# Patient Record
Sex: FEMALE | Race: WHITE | NOT HISPANIC OR LATINO | Employment: PART TIME | URBAN - METROPOLITAN AREA
[De-identification: names, ages, dates, MRNs, and addresses within clinical notes are randomized per-mention and may not be internally consistent; named-entity substitution may affect disease eponyms.]

---

## 2017-02-27 ENCOUNTER — COMMUNICATION - HEALTHEAST (OUTPATIENT)
Dept: INTERNAL MEDICINE | Facility: CLINIC | Age: 52
End: 2017-02-27

## 2017-02-27 DIAGNOSIS — G40.909 EPILEPSY (H): ICD-10-CM

## 2017-02-27 DIAGNOSIS — E78.5 HYPERLIPEMIA: ICD-10-CM

## 2017-02-27 DIAGNOSIS — E03.9 HYPOTHYROID: ICD-10-CM

## 2017-02-27 DIAGNOSIS — F41.9 ANXIETY: ICD-10-CM

## 2017-03-08 ENCOUNTER — COMMUNICATION - HEALTHEAST (OUTPATIENT)
Dept: INTERNAL MEDICINE | Facility: CLINIC | Age: 52
End: 2017-03-08

## 2017-03-24 ENCOUNTER — COMMUNICATION - HEALTHEAST (OUTPATIENT)
Dept: INTERNAL MEDICINE | Facility: CLINIC | Age: 52
End: 2017-03-24

## 2017-03-24 DIAGNOSIS — E03.9 HYPOTHYROID: ICD-10-CM

## 2017-03-27 ENCOUNTER — COMMUNICATION - HEALTHEAST (OUTPATIENT)
Dept: INTERNAL MEDICINE | Facility: CLINIC | Age: 52
End: 2017-03-27

## 2017-04-05 ENCOUNTER — RECORDS - HEALTHEAST (OUTPATIENT)
Dept: ADMINISTRATIVE | Facility: OTHER | Age: 52
End: 2017-04-05

## 2017-06-09 ENCOUNTER — COMMUNICATION - HEALTHEAST (OUTPATIENT)
Dept: INTERNAL MEDICINE | Facility: CLINIC | Age: 52
End: 2017-06-09

## 2017-07-13 ENCOUNTER — COMMUNICATION - HEALTHEAST (OUTPATIENT)
Dept: INTERNAL MEDICINE | Facility: CLINIC | Age: 52
End: 2017-07-13

## 2017-07-18 ENCOUNTER — COMMUNICATION - HEALTHEAST (OUTPATIENT)
Dept: INTERNAL MEDICINE | Facility: CLINIC | Age: 52
End: 2017-07-18

## 2017-08-29 ENCOUNTER — COMMUNICATION - HEALTHEAST (OUTPATIENT)
Dept: INTERNAL MEDICINE | Facility: CLINIC | Age: 52
End: 2017-08-29

## 2017-08-29 ENCOUNTER — OFFICE VISIT - HEALTHEAST (OUTPATIENT)
Dept: INTERNAL MEDICINE | Facility: CLINIC | Age: 52
End: 2017-08-29

## 2017-08-29 DIAGNOSIS — Z00.00 ROUTINE GENERAL MEDICAL EXAMINATION AT A HEALTH CARE FACILITY: ICD-10-CM

## 2017-08-29 DIAGNOSIS — M85.80 OSTEOPENIA: ICD-10-CM

## 2017-08-29 DIAGNOSIS — Z12.4 SCREENING FOR CERVICAL CANCER: ICD-10-CM

## 2017-08-29 DIAGNOSIS — G40.909 EPILEPSY (H): ICD-10-CM

## 2017-08-29 DIAGNOSIS — E78.5 HYPERLIPEMIA: ICD-10-CM

## 2017-08-29 DIAGNOSIS — E03.9 HYPOTHYROID: ICD-10-CM

## 2017-08-29 DIAGNOSIS — E89.40 PREMATURE SURGICAL MENOPAUSE: ICD-10-CM

## 2017-08-29 DIAGNOSIS — F17.200 SMOKING: ICD-10-CM

## 2017-08-29 DIAGNOSIS — D12.6 POLYP OF COLON, ADENOMATOUS: ICD-10-CM

## 2017-08-29 DIAGNOSIS — F41.9 ANXIETY: ICD-10-CM

## 2017-08-29 DIAGNOSIS — E04.1 THYROID NODULE: ICD-10-CM

## 2017-08-29 LAB
CHOLEST SERPL-MCNC: 180 MG/DL
FASTING STATUS PATIENT QL REPORTED: YES
HDLC SERPL-MCNC: 55 MG/DL
LDLC SERPL CALC-MCNC: 96 MG/DL
TRIGL SERPL-MCNC: 146 MG/DL

## 2017-08-29 ASSESSMENT — MIFFLIN-ST. JEOR: SCORE: 1651.15

## 2017-08-31 ENCOUNTER — COMMUNICATION - HEALTHEAST (OUTPATIENT)
Dept: INTERNAL MEDICINE | Facility: CLINIC | Age: 52
End: 2017-08-31

## 2017-09-01 LAB
HPV INTERPRETATION - HISTORICAL: NORMAL
HPV INTERPRETER - HISTORICAL: NORMAL

## 2017-09-06 ENCOUNTER — COMMUNICATION - HEALTHEAST (OUTPATIENT)
Dept: INTERNAL MEDICINE | Facility: CLINIC | Age: 52
End: 2017-09-06

## 2017-09-07 ENCOUNTER — HOSPITAL ENCOUNTER (OUTPATIENT)
Dept: ULTRASOUND IMAGING | Facility: CLINIC | Age: 52
Discharge: HOME | End: 2017-09-07
Attending: INTERNAL MEDICINE

## 2017-09-07 DIAGNOSIS — E03.9 HYPOTHYROID: ICD-10-CM

## 2017-09-07 DIAGNOSIS — E04.1 THYROID NODULE: ICD-10-CM

## 2017-09-14 ENCOUNTER — RECORDS - HEALTHEAST (OUTPATIENT)
Dept: BONE DENSITY | Facility: CLINIC | Age: 52
End: 2017-09-14

## 2017-09-14 ENCOUNTER — RECORDS - HEALTHEAST (OUTPATIENT)
Dept: ADMINISTRATIVE | Facility: OTHER | Age: 52
End: 2017-09-14

## 2017-09-14 DIAGNOSIS — E89.40 ASYMPTOMATIC POSTPROCEDURAL OVARIAN FAILURE: ICD-10-CM

## 2017-09-14 DIAGNOSIS — M85.80 OTHER SPECIFIED DISORDERS OF BONE DENSITY AND STRUCTURE, UNSPECIFIED SITE: ICD-10-CM

## 2017-09-19 ENCOUNTER — COMMUNICATION - HEALTHEAST (OUTPATIENT)
Dept: INTERNAL MEDICINE | Facility: CLINIC | Age: 52
End: 2017-09-19

## 2017-09-28 ENCOUNTER — AMBULATORY - HEALTHEAST (OUTPATIENT)
Dept: NURSING | Facility: CLINIC | Age: 52
End: 2017-09-28

## 2017-11-14 ENCOUNTER — COMMUNICATION - HEALTHEAST (OUTPATIENT)
Dept: INTERNAL MEDICINE | Facility: CLINIC | Age: 52
End: 2017-11-14

## 2017-11-14 DIAGNOSIS — E78.5 HYPERLIPEMIA: ICD-10-CM

## 2017-11-14 DIAGNOSIS — F41.9 ANXIETY: ICD-10-CM

## 2017-12-12 ENCOUNTER — COMMUNICATION - HEALTHEAST (OUTPATIENT)
Dept: INTERNAL MEDICINE | Facility: CLINIC | Age: 52
End: 2017-12-12

## 2017-12-13 ENCOUNTER — COMMUNICATION - HEALTHEAST (OUTPATIENT)
Dept: INTERNAL MEDICINE | Facility: CLINIC | Age: 52
End: 2017-12-13

## 2017-12-13 DIAGNOSIS — E78.5 HYPERLIPEMIA: ICD-10-CM

## 2018-01-15 ENCOUNTER — COMMUNICATION - HEALTHEAST (OUTPATIENT)
Dept: INTERNAL MEDICINE | Facility: CLINIC | Age: 53
End: 2018-01-15

## 2018-01-26 ENCOUNTER — COMMUNICATION - HEALTHEAST (OUTPATIENT)
Dept: INTERNAL MEDICINE | Facility: CLINIC | Age: 53
End: 2018-01-26

## 2018-01-26 DIAGNOSIS — F41.9 ANXIETY: ICD-10-CM

## 2018-02-13 ENCOUNTER — COMMUNICATION - HEALTHEAST (OUTPATIENT)
Dept: INTERNAL MEDICINE | Facility: CLINIC | Age: 53
End: 2018-02-13

## 2018-03-09 ENCOUNTER — COMMUNICATION - HEALTHEAST (OUTPATIENT)
Dept: INTERNAL MEDICINE | Facility: CLINIC | Age: 53
End: 2018-03-09

## 2018-03-16 ENCOUNTER — COMMUNICATION - HEALTHEAST (OUTPATIENT)
Dept: INTERNAL MEDICINE | Facility: CLINIC | Age: 53
End: 2018-03-16

## 2018-03-17 ENCOUNTER — COMMUNICATION - HEALTHEAST (OUTPATIENT)
Dept: INTERNAL MEDICINE | Facility: CLINIC | Age: 53
End: 2018-03-17

## 2018-03-17 DIAGNOSIS — G40.909 EPILEPSY (H): ICD-10-CM

## 2018-03-23 ENCOUNTER — OFFICE VISIT - HEALTHEAST (OUTPATIENT)
Dept: INTERNAL MEDICINE | Facility: CLINIC | Age: 53
End: 2018-03-23

## 2018-03-23 DIAGNOSIS — E78.5 HYPERLIPIDEMIA: ICD-10-CM

## 2018-03-23 DIAGNOSIS — Z12.31 ENCOUNTER FOR SCREENING MAMMOGRAM FOR BREAST CANCER: ICD-10-CM

## 2018-03-23 LAB — LDLC SERPL CALC-MCNC: 213 MG/DL

## 2018-04-02 ENCOUNTER — COMMUNICATION - HEALTHEAST (OUTPATIENT)
Dept: INTERNAL MEDICINE | Facility: CLINIC | Age: 53
End: 2018-04-02

## 2018-04-09 ENCOUNTER — HOSPITAL ENCOUNTER (OUTPATIENT)
Dept: MAMMOGRAPHY | Facility: CLINIC | Age: 53
Discharge: HOME | End: 2018-04-09

## 2018-04-09 DIAGNOSIS — Z12.31 VISIT FOR SCREENING MAMMOGRAM: ICD-10-CM

## 2018-05-02 ENCOUNTER — OFFICE VISIT - HEALTHEAST (OUTPATIENT)
Dept: INTERNAL MEDICINE | Facility: CLINIC | Age: 53
End: 2018-05-02

## 2018-05-02 DIAGNOSIS — K21.9 GERD (GASTROESOPHAGEAL REFLUX DISEASE): ICD-10-CM

## 2018-05-02 DIAGNOSIS — F41.9 ANXIETY: ICD-10-CM

## 2018-05-02 DIAGNOSIS — E78.5 HYPERLIPIDEMIA: ICD-10-CM

## 2018-05-02 LAB
LDLC SERPL CALC-MCNC: 118 MG/DL
TSH SERPL DL<=0.005 MIU/L-ACNC: 2.68 UIU/ML (ref 0.3–5)

## 2018-05-10 ENCOUNTER — COMMUNICATION - HEALTHEAST (OUTPATIENT)
Dept: INTERNAL MEDICINE | Facility: CLINIC | Age: 53
End: 2018-05-10

## 2018-05-31 ENCOUNTER — RECORDS - HEALTHEAST (OUTPATIENT)
Dept: ADMINISTRATIVE | Facility: OTHER | Age: 53
End: 2018-05-31

## 2018-06-06 ENCOUNTER — COMMUNICATION - HEALTHEAST (OUTPATIENT)
Dept: INTERNAL MEDICINE | Facility: CLINIC | Age: 53
End: 2018-06-06

## 2018-06-06 DIAGNOSIS — F41.9 ANXIETY: ICD-10-CM

## 2018-06-06 DIAGNOSIS — G40.909 EPILEPSY (H): ICD-10-CM

## 2018-06-06 DIAGNOSIS — K21.9 GERD (GASTROESOPHAGEAL REFLUX DISEASE): ICD-10-CM

## 2018-06-06 DIAGNOSIS — E03.9 HYPOTHYROID: ICD-10-CM

## 2018-06-06 RX ORDER — LEVETIRACETAM 500 MG/1
TABLET ORAL
Qty: 210 TABLET | Refills: 1 | Status: SHIPPED | OUTPATIENT
Start: 2018-06-06

## 2018-06-06 RX ORDER — TOPIRAMATE 100 MG/1
100 TABLET, FILM COATED ORAL 2 TIMES DAILY
Qty: 180 TABLET | Refills: 3 | Status: SHIPPED | OUTPATIENT
Start: 2018-06-06

## 2018-06-11 ENCOUNTER — COMMUNICATION - HEALTHEAST (OUTPATIENT)
Dept: INTERNAL MEDICINE | Facility: CLINIC | Age: 53
End: 2018-06-11

## 2018-06-11 DIAGNOSIS — E78.5 HYPERLIPIDEMIA: ICD-10-CM

## 2018-06-14 ENCOUNTER — COMMUNICATION - HEALTHEAST (OUTPATIENT)
Dept: INTERNAL MEDICINE | Facility: CLINIC | Age: 53
End: 2018-06-14

## 2018-06-15 ENCOUNTER — COMMUNICATION - HEALTHEAST (OUTPATIENT)
Dept: INTERNAL MEDICINE | Facility: CLINIC | Age: 53
End: 2018-06-15

## 2018-08-03 ENCOUNTER — COMMUNICATION - HEALTHEAST (OUTPATIENT)
Dept: SCHEDULING | Facility: CLINIC | Age: 53
End: 2018-08-03

## 2018-08-09 ENCOUNTER — OFFICE VISIT - HEALTHEAST (OUTPATIENT)
Dept: INTERNAL MEDICINE | Facility: CLINIC | Age: 53
End: 2018-08-09

## 2018-08-09 DIAGNOSIS — M70.62 TROCHANTERIC BURSITIS OF LEFT HIP: ICD-10-CM

## 2018-09-07 ENCOUNTER — COMMUNICATION - HEALTHEAST (OUTPATIENT)
Dept: INTERNAL MEDICINE | Facility: CLINIC | Age: 53
End: 2018-09-07

## 2018-09-07 DIAGNOSIS — E03.9 HYPOTHYROID: ICD-10-CM

## 2018-10-07 ENCOUNTER — COMMUNICATION - HEALTHEAST (OUTPATIENT)
Dept: INTERNAL MEDICINE | Facility: CLINIC | Age: 53
End: 2018-10-07

## 2018-10-07 DIAGNOSIS — K21.9 GERD (GASTROESOPHAGEAL REFLUX DISEASE): ICD-10-CM

## 2019-03-12 ENCOUNTER — COMMUNICATION - HEALTHEAST (OUTPATIENT)
Dept: INTERNAL MEDICINE | Facility: CLINIC | Age: 54
End: 2019-03-12

## 2019-03-12 DIAGNOSIS — F41.9 ANXIETY: ICD-10-CM

## 2019-04-01 ENCOUNTER — OFFICE VISIT - HEALTHEAST (OUTPATIENT)
Dept: INTERNAL MEDICINE | Facility: CLINIC | Age: 54
End: 2019-04-01

## 2019-04-01 DIAGNOSIS — Z00.00 ANNUAL PHYSICAL EXAM: ICD-10-CM

## 2019-04-01 DIAGNOSIS — E04.1 THYROID NODULE: ICD-10-CM

## 2019-04-01 DIAGNOSIS — E78.5 HYPERLIPIDEMIA LDL GOAL <160: ICD-10-CM

## 2019-04-01 DIAGNOSIS — E03.9 HYPOTHYROIDISM (ACQUIRED): ICD-10-CM

## 2019-04-01 DIAGNOSIS — M85.89 OSTEOPENIA OF MULTIPLE SITES: ICD-10-CM

## 2019-04-01 ASSESSMENT — MIFFLIN-ST. JEOR: SCORE: 1649.95

## 2019-04-03 ENCOUNTER — AMBULATORY - HEALTHEAST (OUTPATIENT)
Dept: LAB | Facility: CLINIC | Age: 54
End: 2019-04-03

## 2019-04-03 ENCOUNTER — HOSPITAL ENCOUNTER (OUTPATIENT)
Dept: LAB | Age: 54
Setting detail: SPECIMEN
Discharge: HOME | End: 2019-04-03

## 2019-04-03 ENCOUNTER — COMMUNICATION - HEALTHEAST (OUTPATIENT)
Dept: INTERNAL MEDICINE | Facility: CLINIC | Age: 54
End: 2019-04-03

## 2019-04-03 DIAGNOSIS — E04.1 THYROID NODULE: ICD-10-CM

## 2019-04-03 DIAGNOSIS — E78.5 HYPERLIPIDEMIA LDL GOAL <160: ICD-10-CM

## 2019-04-03 DIAGNOSIS — M85.89 OSTEOPENIA OF MULTIPLE SITES: ICD-10-CM

## 2019-04-03 DIAGNOSIS — E03.9 HYPOTHYROIDISM (ACQUIRED): ICD-10-CM

## 2019-04-03 LAB
CHOLEST SERPL-MCNC: 209 MG/DL
FASTING STATUS PATIENT QL REPORTED: YES
HDLC SERPL-MCNC: 63 MG/DL
LDLC SERPL CALC-MCNC: 119 MG/DL
TRIGL SERPL-MCNC: 134 MG/DL
TSH SERPL DL<=0.005 MIU/L-ACNC: 7.8 UIU/ML (ref 0.3–5)

## 2019-04-04 LAB
25(OH)D3 SERPL-MCNC: 41.8 NG/ML (ref 30–80)
25(OH)D3 SERPL-MCNC: 41.8 NG/ML (ref 30–80)

## 2019-04-05 ENCOUNTER — AMBULATORY - HEALTHEAST (OUTPATIENT)
Dept: INTERNAL MEDICINE | Facility: CLINIC | Age: 54
End: 2019-04-05

## 2019-04-05 DIAGNOSIS — E03.9 HYPOTHYROID: ICD-10-CM

## 2019-04-05 DIAGNOSIS — E03.9 HYPOTHYROIDISM (ACQUIRED): ICD-10-CM

## 2019-04-06 ENCOUNTER — COMMUNICATION - HEALTHEAST (OUTPATIENT)
Dept: INTERNAL MEDICINE | Facility: CLINIC | Age: 54
End: 2019-04-06

## 2019-04-06 DIAGNOSIS — E03.9 HYPOTHYROID: ICD-10-CM

## 2019-04-06 DIAGNOSIS — E03.9 HYPOTHYROIDISM (ACQUIRED): ICD-10-CM

## 2019-05-11 ENCOUNTER — COMMUNICATION - HEALTHEAST (OUTPATIENT)
Dept: INTERNAL MEDICINE | Facility: CLINIC | Age: 54
End: 2019-05-11

## 2019-05-11 DIAGNOSIS — E78.5 HYPERLIPIDEMIA, UNSPECIFIED HYPERLIPIDEMIA TYPE: ICD-10-CM

## 2019-05-11 DIAGNOSIS — E03.9 HYPOTHYROID: ICD-10-CM

## 2019-05-13 ENCOUNTER — COMMUNICATION - HEALTHEAST (OUTPATIENT)
Dept: INTERNAL MEDICINE | Facility: CLINIC | Age: 54
End: 2019-05-13

## 2019-05-13 DIAGNOSIS — E78.41 ELEVATED LIPOPROTEIN(A): ICD-10-CM

## 2019-06-05 ENCOUNTER — COMMUNICATION - HEALTHEAST (OUTPATIENT)
Dept: INTERNAL MEDICINE | Facility: CLINIC | Age: 54
End: 2019-06-05

## 2019-06-05 DIAGNOSIS — E78.5 HYPERLIPIDEMIA, UNSPECIFIED HYPERLIPIDEMIA TYPE: ICD-10-CM

## 2019-08-15 ENCOUNTER — COMMUNICATION - HEALTHEAST (OUTPATIENT)
Dept: INTERNAL MEDICINE | Facility: CLINIC | Age: 54
End: 2019-08-15

## 2019-08-15 DIAGNOSIS — E03.9 HYPOTHYROID: ICD-10-CM

## 2019-08-15 DIAGNOSIS — F41.9 ANXIETY: ICD-10-CM

## 2019-08-15 DIAGNOSIS — E03.9 HYPOTHYROIDISM (ACQUIRED): ICD-10-CM

## 2019-08-15 DIAGNOSIS — E78.5 HYPERLIPIDEMIA, UNSPECIFIED HYPERLIPIDEMIA TYPE: ICD-10-CM

## 2019-09-19 ENCOUNTER — OFFICE VISIT - HEALTHEAST (OUTPATIENT)
Dept: INTERNAL MEDICINE | Facility: CLINIC | Age: 54
End: 2019-09-19

## 2019-09-19 DIAGNOSIS — F41.9 ANXIETY: ICD-10-CM

## 2019-09-19 DIAGNOSIS — E03.9 HYPOTHYROID: ICD-10-CM

## 2019-09-19 DIAGNOSIS — R56.9 SEIZURES (H): ICD-10-CM

## 2019-09-19 DIAGNOSIS — E78.5 HYPERLIPIDEMIA, UNSPECIFIED HYPERLIPIDEMIA TYPE: ICD-10-CM

## 2019-09-19 DIAGNOSIS — E03.9 HYPOTHYROIDISM (ACQUIRED): ICD-10-CM

## 2019-09-19 DIAGNOSIS — E55.9 VITAMIN D DEFICIENCY: ICD-10-CM

## 2019-09-19 RX ORDER — PRAVASTATIN SODIUM 40 MG
40 TABLET ORAL DAILY
Qty: 90 TABLET | Refills: 2 | Status: SHIPPED | OUTPATIENT
Start: 2019-09-19

## 2019-09-19 ASSESSMENT — MIFFLIN-ST. JEOR: SCORE: 1664.92

## 2019-09-21 ENCOUNTER — COMMUNICATION - HEALTHEAST (OUTPATIENT)
Dept: INTERNAL MEDICINE | Facility: CLINIC | Age: 54
End: 2019-09-21

## 2019-09-25 ENCOUNTER — AMBULATORY - HEALTHEAST (OUTPATIENT)
Dept: LAB | Facility: CLINIC | Age: 54
End: 2019-09-25

## 2019-09-25 ENCOUNTER — COMMUNICATION - HEALTHEAST (OUTPATIENT)
Dept: INTERNAL MEDICINE | Facility: CLINIC | Age: 54
End: 2019-09-25

## 2019-09-25 DIAGNOSIS — R56.9 SEIZURES (H): ICD-10-CM

## 2019-09-25 DIAGNOSIS — E03.9 HYPOTHYROIDISM (ACQUIRED): ICD-10-CM

## 2019-09-25 DIAGNOSIS — E78.5 HYPERLIPIDEMIA, UNSPECIFIED HYPERLIPIDEMIA TYPE: ICD-10-CM

## 2019-09-25 DIAGNOSIS — E55.9 VITAMIN D DEFICIENCY: ICD-10-CM

## 2019-09-25 LAB
CHOLEST SERPL-MCNC: 220 MG/DL
FASTING STATUS PATIENT QL REPORTED: YES
HDLC SERPL-MCNC: 65 MG/DL
LDLC SERPL CALC-MCNC: 125 MG/DL
LEVETIRACETAM (KEPPRA): 29.6 UG/ML (ref 6–46)
TRIGL SERPL-MCNC: 148 MG/DL
TSH SERPL DL<=0.005 MIU/L-ACNC: 3.05 UIU/ML (ref 0.3–5)

## 2019-09-26 LAB
25(OH)D3 SERPL-MCNC: 31.5 NG/ML (ref 30–80)
25(OH)D3 SERPL-MCNC: 31.5 NG/ML (ref 30–80)
TOPIRAMATE SERPL-MCNC: 5.9 UG/ML (ref 5–20)

## 2019-09-27 ENCOUNTER — COMMUNICATION - HEALTHEAST (OUTPATIENT)
Dept: INTERNAL MEDICINE | Facility: CLINIC | Age: 54
End: 2019-09-27

## 2019-10-04 ENCOUNTER — OFFICE VISIT - HEALTHEAST (OUTPATIENT)
Dept: INTERNAL MEDICINE | Facility: CLINIC | Age: 54
End: 2019-10-04

## 2019-10-04 DIAGNOSIS — L98.9 SKIN LESION: ICD-10-CM

## 2019-10-04 ASSESSMENT — MIFFLIN-ST. JEOR: SCORE: 1651.31

## 2019-10-08 LAB
LAB AP CHARGES (HE HISTORICAL CONVERSION): NORMAL
PATH REPORT.COMMENTS IMP SPEC: NORMAL
PATH REPORT.FINAL DX SPEC: NORMAL
PATH REPORT.GROSS SPEC: NORMAL
PATH REPORT.MICROSCOPIC SPEC OTHER STN: NORMAL
PATH REPORT.RELEVANT HX SPEC: NORMAL
RESULT FLAG (HE HISTORICAL CONVERSION): NORMAL

## 2020-03-26 ENCOUNTER — COMMUNICATION - HEALTHEAST (OUTPATIENT)
Dept: INTERNAL MEDICINE | Facility: CLINIC | Age: 55
End: 2020-03-26

## 2020-03-26 DIAGNOSIS — E03.9 HYPOTHYROID: ICD-10-CM

## 2020-03-26 DIAGNOSIS — E03.9 HYPOTHYROIDISM (ACQUIRED): ICD-10-CM

## 2020-04-24 ENCOUNTER — COMMUNICATION - HEALTHEAST (OUTPATIENT)
Dept: INTERNAL MEDICINE | Facility: CLINIC | Age: 55
End: 2020-04-24

## 2020-04-24 DIAGNOSIS — F41.9 ANXIETY: ICD-10-CM

## 2020-04-24 DIAGNOSIS — E03.9 HYPOTHYROIDISM (ACQUIRED): ICD-10-CM

## 2020-04-24 DIAGNOSIS — E03.9 HYPOTHYROID: ICD-10-CM

## 2020-04-24 RX ORDER — VENLAFAXINE HYDROCHLORIDE 150 MG/1
150 CAPSULE, EXTENDED RELEASE ORAL DAILY
Qty: 90 CAPSULE | Refills: 0 | Status: SHIPPED | OUTPATIENT
Start: 2020-04-24

## 2020-04-24 RX ORDER — LEVOTHYROXINE SODIUM 88 UG/1
88 TABLET ORAL DAILY
Qty: 90 TABLET | Refills: 0 | Status: SHIPPED | OUTPATIENT
Start: 2020-04-24

## 2020-07-21 ENCOUNTER — COMMUNICATION - HEALTHEAST (OUTPATIENT)
Dept: INTERNAL MEDICINE | Facility: CLINIC | Age: 55
End: 2020-07-21

## 2020-07-21 DIAGNOSIS — E03.9 HYPOTHYROID: ICD-10-CM

## 2020-07-21 DIAGNOSIS — F41.9 ANXIETY: ICD-10-CM

## 2020-07-21 DIAGNOSIS — E78.5 HYPERLIPIDEMIA, UNSPECIFIED HYPERLIPIDEMIA TYPE: ICD-10-CM

## 2020-07-21 DIAGNOSIS — E03.9 HYPOTHYROIDISM (ACQUIRED): ICD-10-CM

## 2021-05-28 NOTE — TELEPHONE ENCOUNTER
Refill Approved    Rx renewed per Medication Renewal Policy. Medication was last renewed on 4/8/19, last OV 4/1/19.    Sweetie Daily, [facility] Triage/Med Refill 5/11/2019     Requested Prescriptions   Pending Prescriptions Disp Refills     levothyroxine (SYNTHROID, LEVOTHROID) 75 MCG tablet [Pharmacy Med Name: LEVOTHYROXINE 0.075MG (75MCG) TABS] 60 tablet 0     Sig: TAKE 1 TABLET(75 MCG) BY MOUTH DAILY       Thyroid Hormones Protocol Passed - 5/11/2019 10:56 AM        Passed - Provider visit in past 12 months or next 3 months     Last office visit with prescriber/PCP: 8/9/2018 Ashlie Lewis MD OR same dept: 4/1/2019 Aleksandra Wilson MD OR same specialty: 4/1/2019 Aleksandra Wilson MD  Last physical: Visit date not found Last MTM visit: Visit date not found   Next visit within 3 mo: Visit date not found  Next physical within 3 mo: Visit date not found  Prescriber OR PCP: Ashlie Lewis MD  Last diagnosis associated with med order: 1. Hypothyroid  - levothyroxine (SYNTHROID, LEVOTHROID) 75 MCG tablet [Pharmacy Med Name: LEVOTHYROXINE 0.075MG (75MCG) TABS]; TAKE 1 TABLET(75 MCG) BY MOUTH DAILY  Dispense: 60 tablet; Refill: 0    If protocol passes may refill for 12 months if within 3 months of last provider visit (or a total of 15 months).             Passed - TSH on file in past 12 months for patient age 12 & older     TSH   Date Value Ref Range Status   04/03/2019 7.80 (H) 0.30 - 5.00 uIU/mL Final

## 2021-05-28 NOTE — TELEPHONE ENCOUNTER
Refill Approved    Rx renewed per Medication Renewal Policy. Medication was last renewed on 3/12/2019 with 3 refills. Change in pharmacy noted.   Last office visit: 4/1/2019 with PCP Dr RAEANN Grey, [facility] Triage/Med Refill 5/12/2019     Requested Prescriptions   Pending Prescriptions Disp Refills     pravastatin (PRAVACHOL) 20 MG tablet [Pharmacy Med Name: PRAVASTATIN 20MG TABLETS] 60 tablet 0     Sig: TAKE 1 TABLET(20 MG) BY MOUTH AT BEDTIME       Statins Refill Protocol (Hmg CoA Reductase Inhibitors) Passed - 5/11/2019 10:58 AM        Passed - PCP or prescribing provider visit in past 12 months      Last office visit with prescriber/PCP: 3/23/2018 Darian Farfan CNP OR same dept: 4/1/2019 Aleksandra Wilson MD OR same specialty: 4/1/2019 Aleksandra Wilson MD  Last physical: Visit date not found Last MTM visit: Visit date not found   Next visit within 3 mo: Visit date not found  Next physical within 3 mo: Visit date not found  Prescriber OR PCP: Darian Farfan CNP  Last diagnosis associated with med order: There are no diagnoses linked to this encounter.  If protocol passes may refill for 12 months if within 3 months of last provider visit (or a total of 15 months).

## 2021-05-31 VITALS — BODY MASS INDEX: 32.97 KG/M2 | WEIGHT: 222.6 LBS | HEIGHT: 69 IN

## 2021-05-31 NOTE — TELEPHONE ENCOUNTER
Refill Request  Did you contact pharmacy: No  Medication name:   Requested Prescriptions     Pending Prescriptions Disp Refills     levothyroxine (SYNTHROID, LEVOTHROID) 75 MCG tablet  0     Sig: Take 1 tablet (75 mcg total) by mouth daily.     pravastatin (PRAVACHOL) 40 MG tablet 90 tablet 3     venlafaxine (EFFEXOR-XR) 150 MG 24 hr capsule 90 capsule 1     Sig: Take 1 capsule (150 mg total) by mouth daily.     Who prescribed the medication: Dr Wilson, Dr Ashlie Lewis, Darian Farfan NP      Pharmacy Name and Location: Changing pharmacy to [facility].  Fax request received from pharmacy.    Okay to leave a detailed message: yes

## 2021-05-31 NOTE — TELEPHONE ENCOUNTER
Former patient of aleksandra Wilson & has not established care with another provider.  Please assign refill request to covering provider per Clinic standard process.      Rx renewed per Medication Renewal Policy. Medication was last renewed on 4/8/19.6/8/19    Mahnaz Cárdenas, [facility] Connection Triage/Med Refill 8/15/2019     Requested Prescriptions   Pending Prescriptions Disp Refills     levothyroxine (SYNTHROID, LEVOTHROID) 75 MCG tablet  0     Sig: Take 1 tablet (75 mcg total) by mouth daily.       Thyroid Hormones Protocol Passed - 8/15/2019  1:41 PM        Passed - Provider visit in past 12 months or next 3 months     Last office visit with prescriber/PCP: 4/1/2019 Aleksandra Wilson MD OR same dept: 4/1/2019 Aleksandra Wilson MD OR same specialty: 4/1/2019 Aleksandra Wilson MD  Last physical: Visit date not found Last MTM visit: Visit date not found   Next visit within 3 mo: Visit date not found  Next physical within 3 mo: Visit date not found  Prescriber OR PCP: Aleksandra Wilson MD  Last diagnosis associated with med order: 1. Hypothyroid  - levothyroxine (SYNTHROID, LEVOTHROID) 75 MCG tablet; Take 1 tablet (75 mcg total) by mouth daily.; Refill: 0    2. Hypothyroidism (acquired)  - levothyroxine (SYNTHROID, LEVOTHROID) 75 MCG tablet; Take 1 tablet (75 mcg total) by mouth daily.; Refill: 0    3. Hyperlipidemia, unspecified hyperlipidemia type  - pravastatin (PRAVACHOL) 40 MG tablet  Dispense: 90 tablet; Refill: 3    4. Anxiety  - venlafaxine (EFFEXOR-XR) 150 MG 24 hr capsule; Take 1 capsule (150 mg total) by mouth daily.  Dispense: 90 capsule; Refill: 1    If protocol passes may refill for 12 months if within 3 months of last provider visit (or a total of 15 months).             Passed - TSH on file in past 12 months for patient age 12 & older     TSH   Date Value Ref Range Status   04/03/2019 7.80 (H) 0.30 - 5.00 uIU/mL Final                   pravastatin (PRAVACHOL) 40 MG tablet 90 tablet 3       Statins Refill Protocol (Hmg CoA  Reductase Inhibitors) Passed - 8/15/2019  1:41 PM        Passed - PCP or prescribing provider visit in past 12 months      Last office visit with prescriber/PCP: 4/1/2019 Aleksandra Wilson MD OR same dept: 4/1/2019 Aleksandra Wilson MD OR same specialty: 4/1/2019 Aleksandra Wilson MD  Last physical: Visit date not found Last MTM visit: Visit date not found   Next visit within 3 mo: Visit date not found  Next physical within 3 mo: Visit date not found  Prescriber OR PCP: Aleksandra Wilson MD  Last diagnosis associated with med order: 1. Hypothyroid  - levothyroxine (SYNTHROID, LEVOTHROID) 75 MCG tablet; Take 1 tablet (75 mcg total) by mouth daily.; Refill: 0    2. Hypothyroidism (acquired)  - levothyroxine (SYNTHROID, LEVOTHROID) 75 MCG tablet; Take 1 tablet (75 mcg total) by mouth daily.; Refill: 0    3. Hyperlipidemia, unspecified hyperlipidemia type  - pravastatin (PRAVACHOL) 40 MG tablet  Dispense: 90 tablet; Refill: 3    4. Anxiety  - venlafaxine (EFFEXOR-XR) 150 MG 24 hr capsule; Take 1 capsule (150 mg total) by mouth daily.  Dispense: 90 capsule; Refill: 1    If protocol passes may refill for 12 months if within 3 months of last provider visit (or a total of 15 months).             venlafaxine (EFFEXOR-XR) 150 MG 24 hr capsule 90 capsule 1     Sig: Take 1 capsule (150 mg total) by mouth daily.       Venlafaxine/Desvenlafaxine Refill Protocol Failed - 8/15/2019  1:41 PM        Failed - LFT or AST or ALT in last year     Albumin   Date Value Ref Range Status   08/29/2017 4.0 3.5 - 5.0 g/dL Final     Bilirubin, Total   Date Value Ref Range Status   08/29/2017 0.4 0.0 - 1.0 mg/dL Final     Alkaline Phosphatase   Date Value Ref Range Status   08/29/2017 64 45 - 120 U/L Final     AST   Date Value Ref Range Status   08/29/2017 15 0 - 40 U/L Final     ALT   Date Value Ref Range Status   08/29/2017 16 0 - 45 U/L Final     Protein, Total   Date Value Ref Range Status   08/29/2017 7.0 6.0 - 8.0 g/dL Final                Passed - Fasting  lipid cascade in last year     Cholesterol   Date Value Ref Range Status   04/03/2019 209 (H) <=199 mg/dL Final     Triglycerides   Date Value Ref Range Status   04/03/2019 134 <=149 mg/dL Final     HDL Cholesterol   Date Value Ref Range Status   04/03/2019 63 >=50 mg/dL Final     LDL Calculated   Date Value Ref Range Status   04/03/2019 119 <=129 mg/dL Final     Patient Fasting > 8hrs?   Date Value Ref Range Status   04/03/2019 Yes  Final             Passed - PCP or prescribing provider visit in last year     Last office visit with prescriber/PCP: 4/1/2019 Aleksandra Wilson MD OR same dept: 4/1/2019 Aleksandra Wilson MD OR same specialty: 4/1/2019 Aleksandra Wilson MD  Last physical: Visit date not found Last MTM visit: Visit date not found   Next visit within 3 mo: Visit date not found  Next physical within 3 mo: Visit date not found  Prescriber OR PCP: Aleksandra Wilson MD  Last diagnosis associated with med order: 1. Hypothyroid  - levothyroxine (SYNTHROID, LEVOTHROID) 75 MCG tablet; Take 1 tablet (75 mcg total) by mouth daily.; Refill: 0    2. Hypothyroidism (acquired)  - levothyroxine (SYNTHROID, LEVOTHROID) 75 MCG tablet; Take 1 tablet (75 mcg total) by mouth daily.; Refill: 0    3. Hyperlipidemia, unspecified hyperlipidemia type  - pravastatin (PRAVACHOL) 40 MG tablet  Dispense: 90 tablet; Refill: 3    4. Anxiety  - venlafaxine (EFFEXOR-XR) 150 MG 24 hr capsule; Take 1 capsule (150 mg total) by mouth daily.  Dispense: 90 capsule; Refill: 1    If protocol passes may refill for 12 months if within 3 months of last provider visit (or a total of 15 months).             Passed - Blood Pressure in last year     BP Readings from Last 1 Encounters:   04/01/19 122/78

## 2021-06-01 VITALS — WEIGHT: 229.56 LBS | BODY MASS INDEX: 34.4 KG/M2

## 2021-06-01 VITALS — WEIGHT: 233.3 LBS | BODY MASS INDEX: 34.96 KG/M2

## 2021-06-01 VITALS — BODY MASS INDEX: 33.34 KG/M2 | WEIGHT: 222.5 LBS

## 2021-06-01 NOTE — PROGRESS NOTES
Internal Medicine Office Visit  [facility]  Patient Name: Carmen Tucker  Patient Age: 54 y.o.  YOB: 1965  MRN: [id number]    Date of Visit: 2019  Reason for Office Visit:   Chief Complaint   Patient presents with     Medication Refill     Needing to have a couple medications refilled.            Assessment / Plan / Medical Decision Makin. Hyperlipidemia, unspecified hyperlipidemia type  - pravastatin (PRAVACHOL) 40 MG tablet; Take 1 tablet (40 mg total) by mouth daily.  Dispense: 90 tablet; Refill: 2  - Lipid [place] FASTING; Future as requested by patient    2. Anxiety  Stable, recommend no changes  - venlafaxine (EFFEXOR-XR) 150 MG 24 hr capsule; Take 1 capsule (150 mg total) by mouth daily.  Dispense: 90 capsule; Refill: 1    3. Hypothyroidism (acquired)  - Thyroid Stimulating Hormone (TSH); Future if above 2.50 will increase to 88mcg daily given patient symptoms    4. Vitamin D deficiency  - Vitamin D, Total (25-Hydroxy); Future    5. Seizures (H)  Keep follow up with Neurology as scheduled.  Continue current medications as prescribed   - Levetiracetam [Keppra ]; Future  - Topiramate [Topamax ]; Future    Recommend patient schedule an establish care appt for spring 2020 as her previous physician is no longer available for patient to be seen.   Orders Placed This Encounter   Procedures     Lipid Cascade FASTING     Thyroid Stimulating Hormone (TSH)     Levetiracetam [Keppra ]     Topiramate [Topamax ]     Vitamin D, Total (25-Hydroxy)     Thyroid Stimulating Hormone (TSH)   Followup: Return in about 6 months (around 3/19/2020). earlier if needed.    Health Maintenance Review  Health Maintenance   Topic Date Due     DEPRESSION FOLLOW UP  1965     HEPATITIS C SCREENING  1965     HIV SCREENING  1978     PNEUMOCOCCAL IMMUNIZATION 19-64 MEDIUM RISK (1 of 1 - PPSV23) 1982     ZOSTER VACCINES (1 of 2) 2013     INFLUENZA  VACCINE RULE BASED (1) 08/01/2019     PREVENTIVE CARE VISIT  04/01/2020     MAMMOGRAM  04/09/2020     COLONOSCOPY  06/19/2020     PAP SMEAR  08/29/2022     HPV TEST  08/29/2022     ADVANCE CARE PLANNING  04/01/2024     TD 18+ HE  11/10/2026     TDAP ADULT ONE TIME DOSE  Completed         I have discontinued Carmen Tucker's omeprazole and levothyroxine. I have also changed her levothyroxine. Additionally, I am having her maintain her (HYDROXYETHYLCELLULOSE (ARTIFICIAL TEAR, GONIOSCOPIC, OPHT)), levETIRAcetam, topiramate, pravastatin, and venlafaxine.      HPI:  Carmen Tucker is a 54 y.o. year old who presents to the office today For med check.  Patient's chronic conditions include hyperlipidemia, hypothyroidism, history of thyroid nodule, epilepsy, anxiety, nicotine dependency, history of bilateral oophorectomy, history of adenomatous polyp of the colon, osteoporosis, fibrocystic breast disease.  Patient was last seen by her primary in April 2019 for an annual wellness exam and was encouraged to exercise, healthy diet, recommendation for weightbearing exercises adding calcium with vitamin D to her diet given diagnosis of osteopenia, adequate plan was also discussed with patient.  Patient has a known history of GERD and had a been on omeprazole for years it was felt that it was controlled some had a EGD and an ENT evaluation had some residual symptoms she is now sleeping with slight elevation at night and avoiding acidic foods with a history of seizures well-controlled no seizures recently is being followed by neurology.      Takes 1500 IU of vitamin D daily.      Review of Systems- pertinent positive in bold:  Constitutional: Fever, chills, night sweats, fainting, weight change, fatigue, dizziness, sleeping difficulties, loud snoring/pauses in breathing  Eyes: change in vision, blurred or double vision, redness/eye pain  Ears, nose, mouth, throat: change in hearing, ear pain, hoarseness, difficulty  swallowing, sores in the mouth or throat  Respiratory: shortness of breath, cough, bloody sputum, wheezing  Cardiovascular: chest pain, palpitations   Gastrointestinal: abdominal pain, heartburn/indigestion, nausea/vomiting, change in appetite, change in bowel habits, constipation or diarrhea, rectal bleeding/dark stools, difficulty swallowing  Urinary: painful urination, frequent urination, urinary urgency/incontinence, blood in urine/dark urine, nocturia (new or increasing), muscle cramps, swelling of hands, feet, ankles, leg pain/redness  Skin: change in moles/freckles, rash, nodules  Hematologic/lymphatic: swollen lymph glands, abnormal bruising/bleeding  Endocrine: excessive thirst/urination, cold or heat intolerance  Neurologic/emotional: worrisome memory change, numbness/tingling, anxiety, mood swings      Current Scheduled Meds:  Outpatient Encounter Medications as of 9/19/2019   Medication Sig Dispense Refill     levETIRAcetam (KEPPRA) 500 MG tablet 3 tabs AM 3.5 tabs  tablet 1     levothyroxine (SYNTHROID, LEVOTHROID) 88 MCG tablet Take 1 tablet (88 mcg total) by mouth daily. 90 tablet 1     pravastatin (PRAVACHOL) 40 MG tablet Take 1 tablet (40 mg total) by mouth daily. 90 tablet 2     topiramate (TOPAMAX) 100 MG tablet Take 1 tablet (100 mg total) by mouth 2 (two) times a day. 180 tablet 3     venlafaxine (EFFEXOR-XR) 150 MG 24 hr capsule Take 1 capsule (150 mg total) by mouth daily. 90 capsule 1     [DISCONTINUED] levothyroxine (SYNTHROID, LEVOTHROID) 75 MCG tablet Take 1 tablet (75 mcg total) by mouth daily. 90 tablet 0     [DISCONTINUED] pravastatin (PRAVACHOL) 40 MG tablet Take 1 tablet (40 mg total) by mouth daily. 90 tablet 0     [DISCONTINUED] venlafaxine (EFFEXOR-XR) 150 MG 24 hr capsule Take 1 capsule (150 mg total) by mouth daily. 90 capsule 0     HYDROXYETHYLCELLULOSE (ARTIFICIAL TEAR, GONIOSCOPIC, OPHT) Apply to eye.       [DISCONTINUED] levothyroxine (SYNTHROID, LEVOTHROID) 75 MCG  "tablet TAKE 1 TABLET(75 MCG) BY MOUTH DAILY 90 tablet 2     [DISCONTINUED] omeprazole (PRILOSEC) 20 MG capsule Take 1 capsule (20 mg total) by mouth daily. 30 capsule 3     [DISCONTINUED] pravastatin (PRAVACHOL) 40 MG tablet Take 1 tablet (40 mg total) by mouth every evening. 90 tablet 3     No facility-administered encounter medications on file as of 9/19/2019.      Past Medical History:   Diagnosis Date     Fibrocystic breast      Past Surgical History:   Procedure Laterality Date     BREAST BIOPSY Left 1988     HYSTERECTOMY  1995     Social History     Tobacco Use     Smoking status: Current Every Day Smoker     Packs/day: 1.00     Years: 30.00     Pack years: 30.00     Types: Cigarettes     Smokeless tobacco: Never Used     Tobacco comment: she knows but not ready to quit   Substance Use Topics     Alcohol use: No     Drug use: No     Family History   Problem Relation Age of Onset     Hypertension Mother      Dementia Father      Mental illness Sister         anxiety     Alcohol abuse Maternal Grandfather      Cardiomyopathy Brother         army related     Mental illness Paternal Grandmother         uncertain dx     Colon cancer Maternal Grandmother      Breast cancer Neg Hx      Objective / Physical Examination:  Vitals:    09/19/19 1246   BP: 122/74   Pulse: 66   Resp: 16   Temp: 98.6  F (37  C)   SpO2: 97%   Weight: (!) 227 lb (103 kg)   Height: 5' 8.11\" (1.73 m)     Wt Readings from Last 3 Encounters:   09/19/19 (!) 227 lb (103 kg)   04/01/19 (!) 223 lb 11.2 oz (101.5 kg)   08/09/18 (!) 222 lb 8 oz (100.9 kg)     Body mass index is 34.4 kg/m .     General Appearance: Alert and oriented, cooperative, affect appropriate, speech clear, in no apparent distress  Head: Normocephalic, atraumatic  Ears: Tympanic membrane clear with landmarks well visualized bilaterally  Eyes: PERRL,  Conjunctivae clear and sclerae non-icteric  Nose: Septum midline, nares patent,  mucosa moist and without drainage  Throat: Lips and " mucosa moist.   Neck: Supple, trachea midline. No cervical adenopathy  Lungs: Clear to auscultation bilaterally. No adventitious lung sounds noted. Normal inspiratory and expiratory effort  Cardiovascular: Regular rate, normal S1, S2.   Abdomen: Bowel sounds active all four quadrants. Soft, non-tender. No hepatomegaly or splenomegaly.   Extremities: Pulses 2+ and equal throughout. No edema.   Integumentary: Warm and dry. Without suspicious looking lesions  Neuro: Alert and oriented, follows commands appropriately.         Snow Moran, CNP

## 2021-06-02 VITALS — WEIGHT: 223.7 LBS | BODY MASS INDEX: 33.9 KG/M2 | HEIGHT: 68 IN

## 2021-06-02 NOTE — PROGRESS NOTES
Internal Medicine Office Visit  [facility]  Patient Name: Carmen Tucker  Patient Age: 54 y.o.  YOB: 1965  MRN: [id number]    Date of Visit: 10/4/2019  Reason for Office Visit:   Chief Complaint   Patient presents with     Nevus     Removal on left thigh. Patient states that its getting bigger. No change in color.            Assessment / Plan / Medical Decision Makin. Skin lesion  Advised to keep the area clean and dry advised of signs of infection when to seek medical care  - Surgical Pathology Exam      Orders Placed This Encounter   Procedures     Influenza, Seasonal Quad, PF =/> 6months   Followup: Return in about 2 weeks (around 10/18/2019). earlier if needed.    Health Maintenance Review  Health Maintenance   Topic Date Due     DEPRESSION FOLLOW UP  1965     HEPATITIS C SCREENING  1965     HIV SCREENING  1978     ZOSTER VACCINES (1 of 2) 2013     INFLUENZA VACCINE RULE BASED (1) 2019     PREVENTIVE CARE VISIT  2020     MAMMOGRAM  2020     COLONOSCOPY  2020     PAP SMEAR  2022     HPV TEST  2022     ADVANCE CARE PLANNING  2024     TD 18+ HE  11/10/2026     PNEUMOCOCCAL IMMUNIZATION 19-64 MEDIUM RISK  Completed     TDAP ADULT ONE TIME DOSE  Completed         I am having Carmen Tucker maintain her (HYDROXYETHYLCELLULOSE (ARTIFICIAL TEAR, GONIOSCOPIC, OPHT)), levETIRAcetam, topiramate, pravastatin, venlafaxine, and levothyroxine.      HPI:  Carmen Tucker is a 54 y.o. year old who presents to the office today for removal of a skin lesion on her left thigh.  Patient reports it has progressively increased in size.          Review of Systems- pertinent positive in bold:  Constitutional: Fever, chills, night sweats, fainting, weight change, fatigue, dizziness, sleeping difficulties, loud snoring/pauses in breathing  Eyes: change in vision, blurred or double vision, redness/eye  pain  Ears, nose, mouth, throat: change in hearing, ear pain, hoarseness, difficulty swallowing, sores in the mouth or throat  Respiratory: shortness of breath, cough, bloody sputum, wheezing  Cardiovascular: chest pain, palpitations   Gastrointestinal: abdominal pain, heartburn/indigestion, nausea/vomiting, change in appetite, change in bowel habits, constipation or diarrhea, rectal bleeding/dark stools, difficulty swallowing  Urinary: painful urination, frequent urination, urinary urgency/incontinence, blood in urine/dark urine, nocturia (new or increasing), muscle cramps, swelling of hands, feet, ankles, leg pain/redness  Skin: See HPI  Hematologic/lymphatic: swollen lymph glands, abnormal bruising/bleeding  Endocrine: excessive thirst/urination, cold or heat intolerance  Neurologic/emotional: worrisome memory change, numbness/tingling, anxiety, mood swings      Current Scheduled Meds:  Outpatient Encounter Medications as of 10/4/2019   Medication Sig Dispense Refill     HYDROXYETHYLCELLULOSE (ARTIFICIAL TEAR, GONIOSCOPIC, OPHT) Apply to eye.       levETIRAcetam (KEPPRA) 500 MG tablet 3 tabs AM 3.5 tabs  tablet 1     levothyroxine (SYNTHROID, LEVOTHROID) 88 MCG tablet Take 1 tablet (88 mcg total) by mouth daily. 90 tablet 1     pravastatin (PRAVACHOL) 40 MG tablet Take 1 tablet (40 mg total) by mouth daily. 90 tablet 2     topiramate (TOPAMAX) 100 MG tablet Take 1 tablet (100 mg total) by mouth 2 (two) times a day. 180 tablet 3     venlafaxine (EFFEXOR-XR) 150 MG 24 hr capsule Take 1 capsule (150 mg total) by mouth daily. 90 capsule 1     No facility-administered encounter medications on file as of 10/4/2019.      Past Medical History:   Diagnosis Date     Fibrocystic breast      Past Surgical History:   Procedure Laterality Date     BREAST BIOPSY Left 1988     HYSTERECTOMY  1995     Social History     Tobacco Use     Smoking status: Current Every Day Smoker     Packs/day: 1.00     Years: 30.00     Pack  "years: 30.00     Types: Cigarettes     Smokeless tobacco: Never Used     Tobacco comment: she knows but not ready to quit   Substance Use Topics     Alcohol use: No     Drug use: No     Family History   Problem Relation Age of Onset     Hypertension Mother      Dementia Father      Mental illness Sister         anxiety     Alcohol abuse Maternal Grandfather      Cardiomyopathy Brother         army related     Mental illness Paternal Grandmother         uncertain dx     Colon cancer Maternal Grandmother      Breast cancer Neg Hx      Objective / Physical Examination:  Vitals:    10/04/19 0705   BP: 132/80   Pulse: 65   Resp: 20   Temp: 98.8  F (37.1  C)   SpO2: 97%   Weight: (!) 224 lb (101.6 kg)   Height: 5' 8.11\" (1.73 m)     Wt Readings from Last 3 Encounters:   10/04/19 (!) 224 lb (101.6 kg)   09/19/19 (!) 227 lb (103 kg)   04/01/19 (!) 223 lb 11.2 oz (101.5 kg)     Body mass index is 33.95 kg/m .     General Appearance: Alert and oriented, cooperative, affect appropriate, speech clear, in no apparent distress  Head: Normocephalic, atraumatic  Eyes:   Conjunctivae clear and sclerae non-icteric  Throat: Lips and mucosa moist.   Lungs: Normal inspiratory and expiratory effort  Integumentary: Warm and dry. Large skin lesion on the left thigh. The area is cleansed in sterile fashion and anesthetized with 2 cc of 1% lidocaine with epi.  With utilization of a dermal blade the lesion is excised hemostasis was obtained.  Bandages applied.  Neuro: Alert and oriented, follows commands appropriately.       Snow Moran, CNP  "

## 2021-06-03 VITALS
WEIGHT: 224 LBS | RESPIRATION RATE: 20 BRPM | SYSTOLIC BLOOD PRESSURE: 132 MMHG | DIASTOLIC BLOOD PRESSURE: 80 MMHG | HEART RATE: 65 BPM | BODY MASS INDEX: 33.95 KG/M2 | OXYGEN SATURATION: 97 % | HEIGHT: 68 IN | TEMPERATURE: 98.8 F

## 2021-06-03 VITALS
WEIGHT: 227 LBS | HEART RATE: 66 BPM | DIASTOLIC BLOOD PRESSURE: 74 MMHG | SYSTOLIC BLOOD PRESSURE: 122 MMHG | BODY MASS INDEX: 34.4 KG/M2 | OXYGEN SATURATION: 97 % | HEIGHT: 68 IN | TEMPERATURE: 98.6 F | RESPIRATION RATE: 16 BRPM

## 2021-06-07 NOTE — TELEPHONE ENCOUNTER
Please advise patient script sent to pharmacy and patient is to complete TSH lab work for future refills.

## 2021-06-09 NOTE — TELEPHONE ENCOUNTER
Patient is due for a follow up for any additional refills. I have called and left a VM stating this. Please assist in scheduling a follow up with Snow Moran.     Route to Snow Moran when patient is scheduled so Rx requests can be addressed.

## 2021-06-12 NOTE — PROGRESS NOTES
ASSESSMENT and PLAN:  1. Anxiety  Well controlled; meds refilled  - venlafaxine (EFFEXOR-XR) 75 MG 24 hr capsule; Take 1 capsule (75 mg total) by mouth daily.  Dispense: 90 capsule; Refill: 3    2. Hyperlipemia  Has been on simvastatin w/o difficulty, labs today  - simvastatin (ZOCOR) 20 MG tablet; Take 1 tablet (20 mg total) by mouth at bedtime.  Dispense: 90 tablet; Refill: 3  - Comprehensive Metabolic Panel  - Lipid Cascade    3. Hypothyroid  She feels her dose is appropriate  - levothyroxine (SYNTHROID, LEVOTHROID) 75 MCG tablet; Take 1 tablet (75 mcg total) by mouth daily.  Dispense: 90 tablet; Refill: 3  - Thyroid [place]  - US Thyroid; Future    4. Epilepsy  Follows w/ epilepsy group.  No sz since 2006.    5. Polyp of colon, adenomatous  She's on a 3 years plan    6. Smoking  Not ready to quit yet    7. Routine general medical examination at a health care facility  Labs today.  Age appropriate cancer screens UTD  - Comprehensive Metabolic Panel    8. Screening for cervical cancer  Done today  - Gynecologic Cytology (PAP Smear)    9. Osteopenia  She's due for a repeat scan this year  - DXA Bone Density Scan; Future  - Vitamin D, Total (25-Hydroxy)    10. Premature surgical menopause  See above  - DXA Bone Density Scan; Future  - Vitamin D, Total (25-Hydroxy)    11. Thyroid nodule  I revied a prior u/s w/ a right sided nodule; I felt one on the left today.  We'll get an u/s.  - US Thyroid; Future        Patient Instructions   Meds refilled.    [phone]- [place] DEXA (last was in 2015).    Today's labs will be available on [facility].  If you aren't signed up, then we'll mail them out.  If anything needs more immediate follow up, we'll also call.    Please let me know when you're ready to be a non-smoker and how we can help out!    It was very nice meeting you today!  Take care!      Orders Placed This Encounter   Procedures     DXA Bone Density Scan     Standing Status:   Future     Standing Expiration  Date:   8/30/2018     Scheduling Instructions:      PATIENT WILL SCHEDULE?  Yes            [facility]       [phone]- [place]       [phone]- [place]       [phone]- [place]      [phone]- [place]     Order Specific Question:   Is the patient pregnant?     Answer:   No     Order Specific Question:   Can the procedure be changed per Radiologist protocol?     Answer:   Yes     US Thyroid     Standing Status:   Future     Standing Expiration Date:   8/29/2018     Order Specific Question:   Priority:     Answer:   Routine [6]     Order Specific Question:   Reason for Exam (Describe Symptoms):     Answer:   hx right sided nodule/FNA, ?left sided nodule     Order Specific Question:   Is the patient pregnant?     Answer:   No     Order Specific Question:   Can the procedure be changed per Radiologist protocol?     Answer:   Yes     Thyroid [place]     Comprehensive Metabolic Panel     Lipid Cascade     Order Specific Question:   Fasting is required?     Answer:   Yes     Vitamin D, Total (25-Hydroxy)     Medications Discontinued During This Encounter   Medication Reason     venlafaxine (EFFEXOR-XR) 75 MG 24 hr capsule Reorder     simvastatin (ZOCOR) 20 MG tablet Reorder     levothyroxine (SYNTHROID, LEVOTHROID) 75 MCG tablet Reorder     efinaconazole 10 % Carolynn Reorder     ciclopirox (PENLAC) 8 % solution Reorder       Return in about 6 months (around 2/28/2018).    ASSESSED PROBLEMS:  Problem List Items Addressed This Visit     Epilepsy    Anxiety    Relevant Medications    venlafaxine (EFFEXOR-XR) 75 MG 24 hr capsule    Smoking    Polyp of colon, adenomatous    Osteopenia    Relevant Orders    DXA Bone Density Scan    Vitamin D, Total (25-Hydroxy)    Thyroid nodule    Relevant Medications    levothyroxine (SYNTHROID, LEVOTHROID) 75 MCG tablet    Other Relevant Orders    US Thyroid      Other Visit Diagnoses     Routine general medical examination at a health care facility    -   Primary    Relevant Orders    Comprehensive Metabolic Panel    Hyperlipemia        Relevant Medications    simvastatin (ZOCOR) 20 MG tablet    Other Relevant Orders    Comprehensive Metabolic Panel    Lipid Cascade    Hypothyroid        Relevant Medications    levothyroxine (SYNTHROID, LEVOTHROID) 75 MCG tablet    Other Relevant Orders    Thyroid [place]    US Thyroid    Screening for cervical cancer        Relevant Orders    Gynecologic Cytology (PAP Smear)    Premature surgical menopause        Relevant Orders    DXA Bone Density Scan    Vitamin D, Total (25-Hydroxy)          CHIEF COMPLAINT:  Chief Complaint   Patient presents with     Annual Exam     fasting labs (had coffe w/cream and sugar) and pap       HISTORY OF PRESENT ILLNESS:  Carmen Tucker is a 52 y.o. female is presenting to the clinic today for an annual exam.  She is also establishing care w/ me.  I see her mom (Wilma).    She's generally doing well.  She's happy w/ her job.  She still smokes and isn't ready to quit.  She's tried chantix and the patches.    Health Maintenance:  She visits the dentist on a regular basis.   Mammogram: 11/14/16  Colonoscopy: 6/19/15, Q3  Pap Smear: 1/10/13    REVIEW OF SYSTEMS:   She denies nipple discharge and drainage. : urgent voiding, happens occasionally   All other systems are negative.    PFSH:  Past Medical History:   Diagnosis Date     Fibrocystic breast      Past Surgical History:   Procedure Laterality Date     BREAST BIOPSY Left 1988     HYSTERECTOMY  1995     Family History   Problem Relation Age of Onset     Hypertension Mother      Dementia Father      Mental illness Sister      anxiety     Alcohol abuse Maternal Grandfather      Cardiomyopathy Brother      army related     Mental illness Paternal Grandmother      uncertain dx     Colon cancer Maternal Grandmother      Breast cancer Neg Hx      Social History     Social History     Marital status: Single     Spouse name: N/A     Number of  "children: N/A     Years of education: N/A     Occupational History     Not on file.     Social History Main Topics     Smoking status: Current Every Day Smoker     Packs/day: 1.00     Years: 30.00     Smokeless tobacco: Never Used     Alcohol use No     Drug use: No     Sexual activity: Not on file     Other Topics Concern     Not on file     Social History Narrative       VITALS:  Vitals:    08/29/17 0827   BP: 116/68   Patient Site: Right Arm   Patient Position: Sitting   Cuff Size: Adult Large   Pulse: 80   Weight: (!) 222 lb 9.6 oz (101 kg)   Height: 5' 8.5\" (1.74 m)     Wt Readings from Last 3 Encounters:   08/29/17 (!) 222 lb 9.6 oz (101 kg)   11/10/16 (!) 222 lb 12.8 oz (101.1 kg)   03/11/16 (P) 214 lb 1.6 oz (97.1 kg)       PHYSICAL EXAM:  Constitutional:  Reveals an alert, pleasant adult female.   Vitals:  Noted.   Head: Normocephalic, without obvious abnormality, atraumatic   Ears: TM's normal bilaterally   Eyes: PERRL, conjunctiva/corneas clear, EOM's intact   Throat: Lips, mucosa, and tongue normal; teeth and gums normal   Neck: Normal ROM, no carotid bruits, left thyroid nodule  Lungs: Clear to auscultation bilaterally, respirations unlabored.   Breast exam:  Normal skin overlying her breasts bilaterally no discrete nodule is palpable in the axilla bilaterally  Heart: Regular rate and rhythm, S1 and S2 normal, no murmur, rub, or gallop,   Abdomen: Soft, non-tender, bowel sounds active all four quadrants, no masses, no organomegaly   Extremities: Extremities normal, atraumatic, no cyanosis or edema   Pelvic:Normally developed genitalia with no external lesions or eruptions. Vagina and cervix show no lesions, inflammation, discharge or tenderness. No cystocele, No rectocele. Uterus non-tender.  No adnexal mass or tenderness.  Skin: Skin color, texture, turgor normal, no rashes or lesions   Neurologic: Normal     MEDICATIONS:  Current Outpatient Prescriptions   Medication Sig Dispense Refill     ciclopirox " (PENLAC) 8 % solution APPLY OVER NAIL, SURROUNDING SKIN & PREVIOUS COAT. AFTER 7 DAYS, REMOVE W/ ALCOHOL AND REPEAT 6.6 mL 1     efinaconazole 10 % Carolynn Apply 1 application topically daily. Topical: Apply to affected toenail(s) once daily for 48 weeks. 8 mL 0     HYDROXYETHYLCELLULOSE (ARTIFICIAL TEAR, GONIOSCOPIC, OPHT) Apply to eye.       levETIRAcetam (KEPPRA) 500 MG tablet 3 tabs AM 3.5 tabs  tablet 5     levothyroxine (SYNTHROID, LEVOTHROID) 75 MCG tablet Take 1 tablet (75 mcg total) by mouth daily. 90 tablet 3     simvastatin (ZOCOR) 20 MG tablet Take 1 tablet (20 mg total) by mouth at bedtime. 90 tablet 3     topiramate (TOPAMAX) 100 MG tablet Take 1 tablet (100 mg total) by mouth 2 (two) times a day. 180 tablet 3     venlafaxine (EFFEXOR-XR) 75 MG 24 hr capsule Take 1 capsule (75 mg total) by mouth daily. 90 capsule 3     ergocalciferol (ERGOCALCIFEROL) 50,000 unit capsule Take 1 capsule (50,000 Units total) by mouth once a week. 20 capsule 0     No current facility-administered medications for this visit.

## 2021-06-13 NOTE — PROGRESS NOTES
Chief Complaint   Patient presents with     Flu Vaccine     Flu consent and contraindication forms are given/ signed/ reviewed and sent to medical records to scan.     Indiana Newsome CMA [facility] 9/28/2017 9:19 AM

## 2021-06-16 PROBLEM — E04.1 THYROID NODULE: Status: ACTIVE | Noted: 2017-08-29

## 2021-06-16 NOTE — PROGRESS NOTES
Clinic Note    Assessment:     Assessment and Plan:  1. Hyperlipidemia  It sounds as though she is having some GI issues  due to her simvastatin.  She is agreeable to a trial of pravastatin.  We will grab a direct LDL cholesterol today, and have her follow-up with us in 2 months for cholesterol recheck.    - LDL Cholesterol, Direct    2. Encounter for screening mammogram for breast cancer  - Mammo Diagnostic Bilateral; Future     Patient Instructions   Please schedule your Mammogram at [facility]: [phone].    I sent in a prescription for Pravastatin to your pharmacy today. Start taking 20 mg daily. After two weeks take two tablets for a total of 40 mg daily. Take at night before bed.     We will recheck your cholesterol today, and have you follow-up with us in 2 months for a recheck.  We will schedule this appointment in the morning so that it can be a fasting lab check.    No Follow-up on file.         Subjective:      Carmen Tucker is a 52 y.o. female who comes in to the clinic with problems with her statin.     She states that her medication has been giving her some indigestion and diarrhea. She would have sudden unexpected diarrhea that would cause her to leave work, she says that she has stopped taking the statin and her symptoms seemed to be much better. She has been using some tums for her breakthrough symptoms.  She has been on the simvastatin for at least the past 2 years.    Patient states that she has a history of epilepsy for which she takes Topamax.  While taking the Topamax she notes she was having difficulty with swallowing food, this led to a drastic weight loss over the course of a few years.  Ultimately, she lost upwards of 70 pounds.  This resulted in improvement of her lipid numbers.    She does admit to eating simple carbohydrates from time to time.  She is not as physically active as she would like.    The following portions of the patient's history were reviewed and updated  as appropriate: Allergies, medications, problems, prior notes, prior labs.    Review of Systems:    Review is negative except for what is mentioned above.     Social Hx:    History   Smoking Status     Current Every Day Smoker     Packs/day: 1.00     Years: 30.00   Smokeless Tobacco     Never Used         Objective:     Vitals:    03/23/18 1034   BP: 126/82   Pulse: 61   SpO2: 96%   Weight: (!) 233 lb 4.8 oz (105.8 kg)       Exam:    General: No apparent distress. Calm. Alert and Oriented X3. Pt behavior is appropriate.      Patient Active Problem List   Diagnosis     Epilepsy     Anxiety     Other and unspecified hyperlipidemia     Smoking     H/O bilateral oophorectomy     Polyp of colon, adenomatous     Osteopenia     Hypothyroidism (acquired)     Thyroid nodule     Current Outpatient Prescriptions   Medication Sig Dispense Refill     ciclopirox (PENLAC) 8 % solution APPLY OVER NAIL, SURROUNDING SKIN & PREVIOUS COAT. AFTER 7 DAYS, REMOVE W/ ALCOHOL AND REPEAT 6.6 mL 1     efinaconazole 10 % Carolynn Apply 1 application topically daily. Topical: Apply to affected toenail(s) once daily for 48 weeks. 8 mL 0     HYDROXYETHYLCELLULOSE (ARTIFICIAL TEAR, GONIOSCOPIC, OPHT) Apply to eye.       levETIRAcetam (KEPPRA) 500 MG tablet 3 tabs AM 3.5 tabs  tablet 5     levothyroxine (SYNTHROID, LEVOTHROID) 75 MCG tablet Take 1 tablet (75 mcg total) by mouth daily. 90 tablet 3     topiramate (TOPAMAX) 100 MG tablet Take 1 tablet (100 mg total) by mouth 2 (two) times a day. 180 tablet 3     venlafaxine (EFFEXOR-XR) 75 MG 24 hr capsule TAKE 1 CAPSULE BY MOUTH EVERY DAY 90 capsule 1     simvastatin (ZOCOR) 20 MG tablet TAKE 1 TABLET BY MOUTH AT BEDTIME 90 tablet 2     No current facility-administered medications for this visit.        Total time spent with patient was 15 minutes with >50% of time spent in face-to-face counseling regarding the above plan       Darian Farfan (Rob), OFELIA    3/23/2018

## 2021-06-17 NOTE — PROGRESS NOTES
ASSESSMENT and PLAN:  1. Anxiety  We discussed options.  She likes venlafaxine so we will increase that to 150 mg.  She will have short-term follow-up.  - venlafaxine (EFFEXOR-XR) 150 MG 24 hr capsule; Take 1 capsule (150 mg total) by mouth daily.  Dispense: 90 capsule; Refill: 1  - Thyroid Cascade    2. Hyperlipidemia  She is doing better on pravastatin then with simvastatin.  - LDL Cholesterol, Direct    3. GERD (gastroesophageal reflux disease)  Given the severity and frequency of her symptoms, we will have her try Prilosec.  She will be following up on this as well as her venlafaxine in a few weeks.  - omeprazole (PRILOSEC) 20 MG capsule; Take 1 capsule (20 mg total) by mouth daily.  Dispense: 30 capsule; Refill: 2      Medications Discontinued During This Encounter   Medication Reason     venlafaxine (EFFEXOR-XR) 75 MG 24 hr capsule Reorder       No Follow-up on file.    Patient Instructions   We'll get labs today.    Please increase your venlafaxine to 150 mg daily.    Please add omeprazole 20 mg daily to your regimen.  Take it on an empty stomach.    Follow up later this month as scheduled.    Take care!      CHIEF COMPLAINT:  Chief Complaint   Patient presents with     Anxiety       HISTORY OF PRESENT ILLNESS:  Carmen Tucker is a 52 y.o. female  presenting to the clinic today for worsening anxiety.  She's been on 75 mg of venlafaxine for about two years.  It was started for similar sx as she's having now.    She tells me that nothing is good right now in her life.  Things that she would normally enjoy she is not enjoying.  People who she would normally enjoy spending time with are now irritating her.  It is torture at times for her to be with people as result, she is lashing out.  Everything bothers her she said that is not normal for her.  She's isolating herself and that's not normal.  She had some fleeting suicidal thoughts but no true suicidal ideation.  She was laid off in October of last year and  has had hard time finding a job but does not view that is very stressful.  She has not had any change in her thyroid medication.  She was in therapy for depression a few years ago and that was helpful.  She is not sleeping well.  She wakes up early and can go back to sleep.  She is then finding that she is sleepy in the afternoon.    She has also had some struggle with reflux.  Her mom has told her she thinks that stress related.  She takes Tums and finds that very helpful.      REVIEW OF SYSTEMS:    All other systems are negative.    PFSH:  Family History   Problem Relation Age of Onset     Hypertension Mother      Dementia Father      Mental illness Sister      anxiety     Alcohol abuse Maternal Grandfather      Cardiomyopathy Brother      army related     Mental illness Paternal Grandmother      uncertain dx     Colon cancer Maternal Grandmother      Breast cancer Neg Hx            TOBACCO USE:  History   Smoking Status     Current Every Day Smoker     Packs/day: 1.00     Years: 30.00   Smokeless Tobacco     Never Used       VITALS:  Vitals:    05/02/18 1146   BP: 120/70   Patient Site: Right Arm   Pulse: 68   Weight: (!) 229 lb 9 oz (104.1 kg)     Wt Readings from Last 3 Encounters:   05/02/18 (!) 229 lb 9 oz (104.1 kg)   03/23/18 (!) 233 lb 4.8 oz (105.8 kg)   08/29/17 (!) 222 lb 9.6 oz (101 kg)     PHYSICAL EXAM:  Constitutional:  Reveals an alert, pleasant adult female.   Vitals:  Noted.   Psych: Articulate, provides good history    QUALITY MEASURES:  The following high BMI interventions were performed this visit: weight monitoring    MEDICATIONS:  Current Outpatient Prescriptions   Medication Sig Dispense Refill     ciclopirox (PENLAC) 8 % solution APPLY OVER NAIL, SURROUNDING SKIN & PREVIOUS COAT. AFTER 7 DAYS, REMOVE W/ ALCOHOL AND REPEAT 6.6 mL 1     efinaconazole 10 % Carolynn Apply 1 application topically daily. Topical: Apply to affected toenail(s) once daily for 48 weeks. 8 mL 0     HYDROXYETHYLCELLULOSE  (ARTIFICIAL TEAR, GONIOSCOPIC, OPHT) Apply to eye.       levETIRAcetam (KEPPRA) 500 MG tablet 3 tabs AM 3.5 tabs  tablet 5     levothyroxine (SYNTHROID, LEVOTHROID) 75 MCG tablet Take 1 tablet (75 mcg total) by mouth daily. 90 tablet 3     pravastatin (PRAVACHOL) 40 MG tablet Take 1 tablet (40 mg total) by mouth every evening. 90 tablet 3     topiramate (TOPAMAX) 100 MG tablet Take 1 tablet (100 mg total) by mouth 2 (two) times a day. 180 tablet 3     venlafaxine (EFFEXOR-XR) 150 MG 24 hr capsule Take 1 capsule (150 mg total) by mouth daily. 90 capsule 1     omeprazole (PRILOSEC) 20 MG capsule Take 1 capsule (20 mg total) by mouth daily. 30 capsule 2     No current facility-administered medications for this visit.

## 2021-06-17 NOTE — PATIENT INSTRUCTIONS - HE
Patient Instructions by Aleksandra Wilson MD at 4/1/2019  9:40 AM     Author: Aleksandra Wilson MD Service: -- Author Type: Physician    Filed: 4/1/2019 10:16 AM Encounter Date: 4/1/2019 Status: Addendum    : Aleksandra Wilson MD (Physician)    Related Notes: Original Note by Aleksandra Wilson MD (Physician) filed at 4/1/2019 10:00 AM         Patient Education   take [id number] of calcium with vitamin D (D3) 800-1200 IU per day; find the ones with them combined.   Try miralax available over the counter- take 1 capful mixed with your drink of diet of choice is three times per week. Add in more leafy green vegetables in your diet. See if this helps with your constipation and diarrhea.     Your mole on your leg is ok.if it bothers you come back for removal at some point.   Prevention Guidelines, Women Ages 50 to 64  Screening tests and vaccines are an important part of managing your health. A screening test is done to find possible disorders or diseases in people who don't have any symptoms. The goal is to find a disease early so lifestyle changes can be made and you can be watched more closely to reduce the risk of disease, or to detect it early enough to treat it most effectively. Screening tests are not considered diagnostic, but are used to determine if more testing is needed. Health counseling is essential, too. Below are guidelines for these, for women ages 50 to 64. Talk with your healthcare provider to make sure youre up to date on what you need.  Screening Who needs it How often   Type 2 diabetes or prediabetes All women beginning at age 45 and women without symptoms at any age who are overweight or obese and have 1 or more additional risk factors for diabetes. At  least every 3 years   Type 2 diabetes or prediabetes All women diagnosed with gestational diabetes Lifelong testing every 3 years   Type 2 diabetes All women with prediabetes Every year   Alcohol misuse All women in this age group At routine exams   Blood  pressure All women in this age group Yearly checkup if your blood pressure is normal  Normal blood pressure is less than 120/80 mm Hg  If your blood pressure reading is higher than normal, follow the advice of your healthcare provider   Breast cancer All women at average risk in this age group Yearly mammogram should be done until age 54. At age 55, switch to mammograms every other year or choose to continue yearly mammograms.  All women should be familiar with the potential benefits and risks of breast cancer screening with mammograms.      Cervical cancer All women in this age group, except women who have had a complete hysterectomy Pap test every 3 years or Pap test with human papillomavirus (HPV) test every 5 years   Chlamydia Women at increased risk for infection At routine exams   Colorectal cancer All women in this age group Flexible sigmoidoscopy every 5 years, or colonoscopy every 10 years, or double-contrast barium enema every 5 years; yearly fecal occult blood test or fecal immunochemical test; or a stool DNA test as often as your health care provider advises; talk with your health care provider about which tests are best for you   Depression All women in this age group At routine exams   Gonorrhea Sexually active women at increased risk for infection At routine exams   Hepatitis C Anyone at increased risk; 1 time for those born between 1945 and 1965 At routine exams   High cholesterol or triglycerides All women in this age group who are at risk for coronary artery disease At least every 5 years   HIV All women At routine exams   Lung cancer Adults age 55 to 80 who have smoked Yearly screening in smokers with 30 pack-year history of smoking or who quit within 15 years   Obesity All women in this age group At routine exams   Osteoporosis Women who are postmenopausal Ask your healthcare provider   Syphilis Women at increased risk for infection - talk with your healthcare provider At routine exams    Tuberculosis Women at increased risk for infection - talk with your healthcare provider Ask your healthcare provider   Vision All women in this age group Ask your healthcare provider   Vaccine Who needs it How often   Chickenpox (varicella) All women in this age group who have no record of this infection or vaccine 2 doses; the second dose should be given at least 4 weeks after the first dose   Hepatitis A Women at increased risk for infection - talk with your healthcare provider 2 doses given at least 6 months apart   Hepatitis B Women at increased risk for infection - talk with your healthcare provider 3 doses over 6 months; second dose should be given 1 month after the first dose; the third dose should be given at least 2 months after the second dose and at least 4 months after the first dose   Haemophilus influenzaeType B (HIB) Women at increased risk for infection - talk with your healthcare provider 1 to 3 doses   Influenza (flu) All women in this age group Once a year   Measles, mumps, rubella (MMR) Women in this age group through their late 50s who have no record of these infections or vaccines 1 dose   Meningococcal Women at increased risk for infection - talk with your healthcare provider 1 or more doses   Pneumococcal conjugate vaccine (PCV13) and pneumococcal polysaccharide vaccine (PPSV23) Women at increased risk for infection - talk with your healthcare provider PCV13: 1 dose ages 19 to 65 (protects against 13 types of pneumococcal bacteria)  PPSV23: 1 to 2 doses through age 64, or 1 dose at 65 or older (protects against 23 types of pneumococcal bacteria)   Tetanus/diphtheria/pertussis (Td/Tdap) booster All women in this age group Td every 10 years, or a one-time dose of Tdap instead of a Td booster after age 18, then Td every 10 years   Zoster All women ages 60 and older 1 dose   Counseling Who needs it How often   BRCA gene mutation testing for breast and ovarian cancer susceptibility Women with  increased risk for having gene mutation When your risk is known   Breast cancer and chemoprevention Women at high risk for breast cancer When your risk is known   Diet and exercise Women who are overweight or obese When diagnosed, and then at routine exams   Sexually transmitted infection prevention Women at increased risk for infection - talk with your healthcare provider At routine exams   Use of daily aspirin Women ages 55 and up in this age group who are at risk for cardiovascular health problems such as stroke When your risk is known   Use of tobacco and the health effects it can cause All women in this age group Every exam   1[employer]  Date Last Reviewed: 1/26/2016 2000-2017 [employer]. [address] [place] [address], [place] [address]. All rights reserved. This information is not intended as a substitute for professional medical care. Always follow your healthcare professional's instructions.

## 2021-06-20 NOTE — LETTER
Letter by Snow Moran CNP at      Author: Snow Moran CNP Service: -- Author Type: --    Filed:  Encounter Date: 7/21/2020 Status: (Other)         Carmen Tucker  [address] [facility] [address]      07/28/20      Dear Carmen,      In reviewing your records, we have determined a medication check is needed before your next refill, please call the [facility] to schedule an appointment.      Medication check/review      We have made attempts to call you for an appointment, please verify your contact information is correct when calling back for an appointment, or if you have transferred your care to another clinic, please contact us so we can update our records.     Please call [phone] to schedule an appointment.    We believe that a strong preventative care program, including regular physicals and follow-up care is an important part of a healthy lifestyle and we are committed to helping you maintain your health.    Thank you for choosing us as your health care provider.    Sincerely,    Razia Snachez  [facility]  [address] [address]  [phone]

## 2021-06-24 NOTE — TELEPHONE ENCOUNTER
Patient is requesting refills and stated I am scheduled on 4/1/19 to establish care with Dr. Aleksandra Wilson.  Patient has been out of this Cholesterol medication x 2 weeks

## 2021-06-24 NOTE — TELEPHONE ENCOUNTER
RN cannot approve Refill Request    RN can NOT refill this medication overdue for office visits and/or labs.    Alistair Gracia, [facility] Triage/Med Refill 3/12/2019    Requested Prescriptions   Pending Prescriptions Disp Refills     venlafaxine (EFFEXOR-XR) 150 MG 24 hr capsule 90 capsule 1     Sig: Take 1 capsule (150 mg total) by mouth daily.    Venlafaxine/Desvenlafaxine Refill Protocol Failed - 3/12/2019  8:53 AM       Failed - LFT or AST or ALT in last year    Albumin   Date Value Ref Range Status   08/29/2017 4.0 3.5 - 5.0 g/dL Final     Bilirubin, Total   Date Value Ref Range Status   08/29/2017 0.4 0.0 - 1.0 mg/dL Final     Alkaline Phosphatase   Date Value Ref Range Status   08/29/2017 64 45 - 120 U/L Final     AST   Date Value Ref Range Status   08/29/2017 15 0 - 40 U/L Final     ALT   Date Value Ref Range Status   08/29/2017 16 0 - 45 U/L Final     Protein, Total   Date Value Ref Range Status   08/29/2017 7.0 6.0 - 8.0 g/dL Final               Failed - Fasting lipid cascade in last year    Cholesterol   Date Value Ref Range Status   08/29/2017 180 <=199 mg/dL Final     Triglycerides   Date Value Ref Range Status   08/29/2017 146 <=149 mg/dL Final     HDL Cholesterol   Date Value Ref Range Status   08/29/2017 55 >=50 mg/dL Final     LDL Calculated   Date Value Ref Range Status   08/29/2017 96 <=129 mg/dL Final     Patient Fasting > 8hrs?   Date Value Ref Range Status   08/29/2017 Yes  Final            Passed - PCP or prescribing provider visit in last year    Last office visit with prescriber/PCP: 8/9/2018 Ashlie Lewis MD OR same dept: 8/9/2018 Ashlie Lewis MD OR same specialty: 8/9/2018 Ashlie Lewis MD  Last physical: Visit date not found Last MTM visit: Visit date not found   Next visit within 3 mo: Visit date not found  Next physical within 3 mo: Visit date not found  Prescriber OR PCP: Ashlie Lewis MD  Last diagnosis associated with med order: 1. Anxiety  - venlafaxine  (EFFEXOR-XR) 150 MG 24 hr capsule; Take 1 capsule (150 mg total) by mouth daily.  Dispense: 90 capsule; Refill: 1    If protocol passes may refill for 12 months if within 3 months of last provider visit (or a total of 15 months).            Passed - Blood Pressure in last year    BP Readings from Last 1 Encounters:   08/09/18 112/82             pravastatin (PRAVACHOL) 40 MG tablet 90 tablet 3     Sig: Take 1 tablet (40 mg total) by mouth every evening.    Statins Refill Protocol (Hmg CoA Reductase Inhibitors) Passed - 3/12/2019  8:53 AM       Passed - PCP or prescribing provider visit in past 12 months     Last office visit with prescriber/PCP: 8/9/2018 Ashlie Lewis MD OR same dept: 8/9/2018 Ashlie Lewis MD OR same specialty: 8/9/2018 Ashlie Lewis MD  Last physical: Visit date not found Last MTM visit: Visit date not found   Next visit within 3 mo: Visit date not found  Next physical within 3 mo: Visit date not found  Prescriber OR PCP: Ashlie Lewis MD  Last diagnosis associated with med order: 1. Anxiety  - venlafaxine (EFFEXOR-XR) 150 MG 24 hr capsule; Take 1 capsule (150 mg total) by mouth daily.  Dispense: 90 capsule; Refill: 1    If protocol passes may refill for 12 months if within 3 months of last provider visit (or a total of 15 months).

## 2021-06-26 NOTE — PROGRESS NOTES
Progress Notes by Ashlie Lewis at 8/9/2018  2:05 PM     Author: Ashlie Lewis Service: -- Author Type: Physician    Filed: 8/9/2018  2:48 PM Encounter Date: 8/9/2018 Status: Signed    : Ashlie Lewis       ASSESSMENT and PLAN:  1. Trochanteric bursitis of left hip  We discussed conservative management.  She declined PT at this time due to time constraints.  She'll follow up if not improving w/ home measures.      Medications Discontinued During This Encounter   Medication Reason   ? simvastatin (ZOCOR) 20 MG tablet        No Follow-up on file.    Patient Instructions       Iliotibial Band Stretch (Flexibility)    1. Stand next to a chair. Hold onto the chair with your right hand for support. Cross your right leg behind your left leg.  2. Lean your right hip toward the right. Feel the stretch at the outside of your hip.  3. Hold for 30 to 60 seconds. Then relax.  4. Repeat 2 times, or as instructed.  5. Switch sides and repeat.  6. Do this 3 times a day, or as instructed.     Tip: Dont bend forward or twist at the waist.   Date Last Reviewed: 3/29/2016    [id number] [employer]. [address] [place] [address]. All rights reserved. This information is not intended as a substitute for professional medical care. Always follow your healthcare professional's instructions.        Understanding Trochanteric Bursitis    A bursa is a thin, slippery, sac-like film. It contains a small amount of fluid. This structure is found between bones and soft tissues in and around joints. A bursa cushions and protects a joint. It keeps parts of a joint from rubbing against each other. If a bursa becomes inflamed and irritated, it is known as bursitis.  The trochanteric bursa is found on the hip joint. It lies on top of the bump at the top of the thighbone called the greater trochanter. Inflammation of this bursa is called trochanteric bursitis.     How to say it  [id number]   Causes of  trochanteric bursitis  Causes may include:    Overuse of the hip during running or other sports, dance, or work    Falling on or irritation to the side of the hip  This condition may occur along with other problems, such as osteoarthritis of the hip or knee, or low back problems. In rare cases, it may occur after hip surgery.  Symptoms of trochanteric bursitis    Pain or aching on the side of the hip. The pain may travel down the leg.    Swelling, tenderness, or warmth on the side of the hip at the bony bump at the top of the thigh  Treatment for trochanteric bursitis  These may include:    Resting the hip. This allows the bursa to heal.    Prescription or over-the-counter pain medicines. These help reduce inflammation, swelling, and pain.    Cold packs and heat packs. These help reduce pain and swelling.    Stretching and strengthening exercises. These improve flexibility and strength around the hip.    Physical therapy. This includes exercises or other treatments.    Injections of medicine into the bursa. This may help reduce inflammation and relieve symptoms.  Possible complications  If you dont give your hip time to heal, the problem may not go away, may return, or may get worse. Rest and treat your hip as directed.     When to call your healthcare provider  Call your healthcare provider right away if you have any of these:    Fever of 100.4 F (38 C) or higher, or as directed    Redness, swelling, or warmth that gets worse    Symptoms that dont get better with prescribed medicines, or get worse    New symptoms   Date Last Reviewed: 3/29/2016    [id number] [employer]. [address] [place] [address], [place] [address]. All rights reserved. This information is not intended as a substitute for professional medical care. Always follow your healthcare professional's instructions.            CHIEF COMPLAINT:  Chief Complaint   Patient presents with   ? Hip Pain     x 3 weeks goes down her leg (left)       HISTORY  OF PRESENT ILLNESS:  Carmen Tucker is a 53 y.o. female  presenting to the clinic today for left sided hip pain.  It started about three weeks ago.  She tried switching shoes, but that hasn't helped.  She had no preceding trauma to her hip.  She was on vacation and was walking quite a bit.  She dropped a large water bottle on her foot.  It was her left foot.  It was bruised and quite painful.  Because of that, she was walking a little bit differently than normal.  Her hip pain is now radiating down her left leg to her anterior shin.  She denies any back pain.  She denies any weakness.  She has an awareness of her left leg though.  No groin pain.  It hurts to lay on her left side.    REVIEW OF SYSTEMS:    All other systems are negative.    PFSH:  Family History   Problem Relation Age of Onset   ? Hypertension Mother    ? Dementia Father    ? Mental illness Sister      anxiety   ? Alcohol abuse Maternal Grandfather    ? Cardiomyopathy Brother      army related   ? Mental illness Paternal Grandmother      uncertain dx   ? Colon cancer Maternal Grandmother    ? Breast cancer Neg Hx      TOBACCO USE:  History   Smoking Status   ? Current Every Day Smoker   ? Packs/day: 1.00   ? Years: 30.00   Smokeless Tobacco   ? Never Used       VITALS:  Vitals:    08/09/18 1405   BP: 112/82   Pulse: 68   Resp: 14   Temp: 99.1  F (37.3  C)   SpO2: 98%   Weight: (!) 222 lb 8 oz (100.9 kg)     Wt Readings from Last 3 Encounters:   08/09/18 (!) 222 lb 8 oz (100.9 kg)   05/02/18 (!) 229 lb 9 oz (104.1 kg)   03/23/18 (!) 233 lb 4.8 oz (105.8 kg)     PHYSICAL EXAM:  Constitutional:  Reveals an alert, pleasant adult female.   Vitals:  Noted.   Extremities: Tender trochanter left, no pain w/ internal or external rotation  Neurologic: strength is normal in her LE bilateral, neg straight leg raise    QUALITY MEASURES:  The following high BMI interventions were performed this visit: weight monitoring    MEDICATIONS:  Current Outpatient  Prescriptions   Medication Sig Dispense Refill   ? ciclopirox (PENLAC) 8 % solution APPLY OVER NAIL, SURROUNDING SKIN & PREVIOUS COAT. AFTER 7 DAYS, REMOVE W/ ALCOHOL AND REPEAT 6.6 mL 1   ? efinaconazole 10 % Carolynn Apply 1 application topically daily. Topical: Apply to affected toenail(s) once daily for 48 weeks. 8 mL 0   ? HYDROXYETHYLCELLULOSE (ARTIFICIAL TEAR, GONIOSCOPIC, OPHT) Apply to eye.     ? levETIRAcetam (KEPPRA) 500 MG tablet 3 tabs AM 3.5 tabs  tablet 1   ? levothyroxine (SYNTHROID, LEVOTHROID) 75 MCG tablet Take 1 tablet (75 mcg total) by mouth daily. 60 tablet 0   ? omeprazole (PRILOSEC) 20 MG capsule Take 1 capsule (20 mg total) by mouth daily. 30 capsule 3   ? pravastatin (PRAVACHOL) 40 MG tablet Take 1 tablet (40 mg total) by mouth every evening. 90 tablet 3   ? topiramate (TOPAMAX) 100 MG tablet Take 1 tablet (100 mg total) by mouth 2 (two) times a day. 180 tablet 3   ? venlafaxine (EFFEXOR-XR) 150 MG 24 hr capsule Take 1 capsule (150 mg total) by mouth daily. 90 capsule 1     No current facility-administered medications for this visit.

## 2021-06-27 ENCOUNTER — HEALTH MAINTENANCE LETTER (OUTPATIENT)
Age: 56
End: 2021-06-27

## 2021-06-27 NOTE — PROGRESS NOTES
Progress Notes by Aleksandra Wilson MD at 4/1/2019  9:40 AM     Author: Aleksandra Wilson MD Service: -- Author Type: Physician    Filed: 4/1/2019  8:20 PM Encounter Date: 4/1/2019 Status: Signed    : Aleksandra Wilson MD (Physician)       FEMALE PREVENTATIVE EXAM    Assessment and Plan:       1. Annual physical exam  Discussed healthy diet, exercise and sleep.     2. Osteopenia of multiple sites  Discussed continued weight bearing exercises. Recommended adding calcium to her vitamin D. She has vitamin D deficiency and wants a vitamin D level.   - Vitamin D, Total (25-Hydroxy); Future    3. Hypothyroidism (acquired)  - Thyroid Stimulating Hormone (TSH); Future    4. Thyroid nodule    - Thyroid Stimulating Hormone (TSH); Future    5. Hyperlipidemia LDL goal <160    - Lipid Cascade; Future     Next follow up:  Return in about 1 year (around 4/1/2020) for Annual physical.    Immunization Review  Adult Imm Review: No immunizations due today  BMI: obesity  Documented tobacco use.  Website and phone contact for QuitPlan given to patient in AVS.    I discussed the following with the patient:   Adult Healthy Living: Importance of regular exercise  Healthy nutrition  Weight loss referral options  Stress management    I have had an Advance Directives discussion with the patient.    Subjective:   Chief Complaint: Carmen Tucker is an 53 y.o. female here for a preventative health visit.     HPI:  Here for a physical and has other concerns. She has osteopenia and is only on vitamin D and no calcium. She is walking her dog most days. She has some intermittent diarrhea and constipation. She does not eat too many green leafy vegetables. She has no abd pain. She has history of GERD and has been on omeprazole for years now and is controlled some. Has had EGD and ENT evaluation. She has some residual symptoms so she is now sleeping with a slight elevation in at night and avoiding acidic foods. She also has history of seizure but  "controlled and no seizures recently.     Healthy Habits  Are you taking a daily aspirin? No  Do you typically exercising at least 40 min, 3-4 times per week?  Yes  Do you usually eat at least 4 servings of fruit and vegetables a day, include whole grains and fiber and avoid regularly eating high fat foods? Yes  Have you had an eye exam in the past two years? Yes  Do you see a dentist twice per year? Yes  Do you have any concerns regarding sleep? YES    Safety Screen  If you own firearms, are they secured in a locked gun cabinet or with trigger locks? NO  Do you feel you are safe where you are living?: Yes (4/1/2019  9:38 AM)  Do you feel you are safe in your relationship(s)?: Yes (4/1/2019  9:38 AM)      Review of Systems:  Please see above.  The rest of the review of systems are negative for all systems.     Pap History:   No - age 30-65 PAP every 3 years recommended  Cancer Screening       Status Date      MAMMOGRAM Next Due 4/9/2020      Done 4/9/2018 MAMMO SCREENING BILATERAL     Patient has more history with this topic...    COLONOSCOPY Next Due 6/19/2020      Done 6/19/2015 COLONOSCOPY EXTERNAL RESULT    PAP SMEAR Next Due 8/29/2022      Done 8/29/2017 GYNECOLOGIC CYTOLOGY (PAP SMEAR)     Patient has more history with this topic...          Patient Care Team:  Aleksandra Wilson MD as PCP - General (Internal Medicine)        History     Reviewed By Date/Time Sections Reviewed    Aleksandra Wilson MD 4/1/2019  9:50 AM Surgical, Tobacco, Alcohol, Drug Use, Sexual Activity, Family    Brianne Gu, [facility] 4/1/2019  9:39 AM Tobacco            Objective:   Vital Signs:   Visit Vitals  /78 (Patient Site: Right Arm, Patient Position: Sitting, Cuff Size: Adult Large)   Pulse 68   Ht 5' 8.11\" (1.73 m)   Wt (!) 223 lb 11.2 oz (101.5 kg)   BMI 33.90 kg/m           PHYSICAL EXAM  Gen: alert and oriented X3. Looks in No distress  HEENT: PERLLA; EOMI. O/P clear and No erythema. Nose clear with no drainage.   Neck: supple with " no LAD. No thyromegaly. No carotid bruits.  Lungs: Yes normal breath sounds bilaterally without wheezing or rhonchi  CV: RRR without murmur or gallop. Normal pulses. CRT <2 sec  Abd: soft and Normal BS. No tenderness; No distention. No organomegaly.   : NL Female  genitalia.   Ext: without edema; without trauma.   Neuro: CN 2-12 Normal and DTRs are  symmetric; Normal strength  Skin: No rash: description: NA. Warm and dry.   Psych: normal        The 10-year ASCVD risk score (Bern HECTOR Jr., et al., 2013) is: 3.9%    Values used to calculate the score:      Age: 53 years      Sex: Female      Is Non- : No      Diabetic: No      Tobacco smoker: Yes      Systolic Blood Pressure: 122 mmHg      Is BP treated: No      HDL Cholesterol: 55 mg/dL      Total Cholesterol: 180 mg/dL         Medication List           Accurate as of 4/1/19  8:20 PM. If you have any questions, ask your nurse or doctor.               CONTINUE taking these medications    ARTIFICIAL TEAR (GONIOSCOPIC) OPHT  INSTRUCTIONS:  Apply to eye.        levETIRAcetam 500 MG tablet  Also known as:  KEPPRA  INSTRUCTIONS:  3 tabs AM 3.5 tabs PM        levothyroxine 75 MCG tablet  Also known as:  SYNTHROID, LEVOTHROID  INSTRUCTIONS:  Take 1 tablet (75 mcg total) by mouth daily.        omeprazole 20 MG capsule  Also known as:  PriLOSEC  INSTRUCTIONS:  Take 1 capsule (20 mg total) by mouth daily.        pravastatin 40 MG tablet  Also known as:  PRAVACHOL  INSTRUCTIONS:  Take 1 tablet (40 mg total) by mouth every evening.        topiramate 100 MG tablet  Also known as:  TOPAMAX  INSTRUCTIONS:  Take 1 tablet (100 mg total) by mouth 2 (two) times a day.        venlafaxine 150 MG 24 hr capsule  Also known as:  EFFEXOR-XR  INSTRUCTIONS:  Take 1 capsule (150 mg total) by mouth daily.           STOP taking these medications    ciclopirox 8 % solution  Also known as:  PENLAC  Stopped by:  Aleksandra Wilson MD     efinaconazole 10 % Kalani  Stopped by:  Aleksandra MOORE  MD Steve            Additional Screenings Completed Today:

## 2021-10-17 ENCOUNTER — HEALTH MAINTENANCE LETTER (OUTPATIENT)
Age: 56
End: 2021-10-17

## 2022-07-23 ENCOUNTER — HEALTH MAINTENANCE LETTER (OUTPATIENT)
Age: 57
End: 2022-07-23

## 2022-10-01 ENCOUNTER — HEALTH MAINTENANCE LETTER (OUTPATIENT)
Age: 57
End: 2022-10-01

## 2022-11-04 ENCOUNTER — LAB REQUISITION (OUTPATIENT)
Dept: LAB | Facility: CLINIC | Age: 57
End: 2022-11-04

## 2022-11-04 PROCEDURE — 86765 RUBEOLA ANTIBODY: CPT | Performed by: INTERNAL MEDICINE

## 2022-11-04 PROCEDURE — 86735 MUMPS ANTIBODY: CPT | Performed by: INTERNAL MEDICINE

## 2022-11-04 PROCEDURE — 86787 VARICELLA-ZOSTER ANTIBODY: CPT | Performed by: INTERNAL MEDICINE

## 2022-11-04 PROCEDURE — 86762 RUBELLA ANTIBODY: CPT | Performed by: INTERNAL MEDICINE

## 2022-11-04 PROCEDURE — 86481 TB AG RESPONSE T-CELL SUSP: CPT | Performed by: INTERNAL MEDICINE

## 2022-11-07 LAB
GAMMA INTERFERON BACKGROUND BLD IA-ACNC: 0.03 IU/ML
M TB IFN-G BLD-IMP: NEGATIVE
M TB IFN-G CD4+ BCKGRND COR BLD-ACNC: 9.97 IU/ML
MEV IGG SER IA-ACNC: 10.9 AU/ML
MEV IGG SER IA-ACNC: NORMAL
MITOGEN IGNF BCKGRD COR BLD-ACNC: -0.01 IU/ML
MITOGEN IGNF BCKGRD COR BLD-ACNC: 0.02 IU/ML
MUMPS ANTIBODY IGG INSTRUMENT VALUE: 96.1 AU/ML
MUV IGG SER QL IA: POSITIVE
QUANTIFERON MITOGEN: 10 IU/ML
QUANTIFERON NIL TUBE: 0.03 IU/ML
QUANTIFERON TB1 TUBE: 0.02 IU/ML
QUANTIFERON TB2 TUBE: 0.05
RUBV IGG SERPL QL IA: 3.28 INDEX
RUBV IGG SERPL QL IA: POSITIVE
VZV IGG SER QL IA: >4000 INDEX
VZV IGG SER QL IA: POSITIVE

## 2023-07-24 ENCOUNTER — HOSPITAL ENCOUNTER (EMERGENCY)
Facility: CLINIC | Age: 58
Discharge: HOME | End: 2023-07-24
Attending: EMERGENCY MEDICINE | Admitting: EMERGENCY MEDICINE
Payer: COMMERCIAL

## 2023-07-24 ENCOUNTER — APPOINTMENT (OUTPATIENT)
Dept: RADIOLOGY | Facility: CLINIC | Age: 58
End: 2023-07-24
Attending: EMERGENCY MEDICINE
Payer: COMMERCIAL

## 2023-07-24 VITALS
TEMPERATURE: 98.1 F | BODY MASS INDEX: 35.19 KG/M2 | HEART RATE: 78 BPM | RESPIRATION RATE: 18 BRPM | WEIGHT: 232.2 LBS | OXYGEN SATURATION: 96 % | DIASTOLIC BLOOD PRESSURE: 104 MMHG | HEIGHT: 68 IN | SYSTOLIC BLOOD PRESSURE: 181 MMHG

## 2023-07-24 DIAGNOSIS — M25.462 EFFUSION OF LEFT KNEE: ICD-10-CM

## 2023-07-24 DIAGNOSIS — S86.912A STRAIN OF LEFT KNEE, INITIAL ENCOUNTER: ICD-10-CM

## 2023-07-24 DIAGNOSIS — M25.562 ACUTE PAIN OF LEFT KNEE: ICD-10-CM

## 2023-07-24 PROCEDURE — 99283 EMERGENCY DEPT VISIT LOW MDM: CPT

## 2023-07-24 PROCEDURE — 73562 X-RAY EXAM OF KNEE 3: CPT | Mod: LT

## 2023-07-24 NOTE — Clinical Note
Carmen Tucker was seen and treated in our emergency department on 7/24/2023.  She may return to work on 07/27/2023.       If you have any questions or concerns, please don't hesitate to call.      Valerie Vang MD

## 2023-07-25 NOTE — DISCHARGE INSTRUCTIONS
You were seen in the emergency department today for knee pain. Your imaging showed a small amount of fluid around your knee. As we discussed, this might be due to an injured ligament, tendon, or meniscus or possibly a ruptured Baker's cyst. These things don't show up on the x-ray.     To help with pain:  - Ice the injury for 20 minutes, 3-5 times per day (or up to every 2 hours as needed) for the first 24-72 hours. You can either use an ice pack or plastic bag filled with ice, cover either one with a damp cloth to prevent the cold from burning your skin.   - Wear the brace and try to elevate and stay off your feet as much as you can.   - You may take 650-1000mg of Acetaminophen (Tylenol).  Please do not use more than 3000 mg in a 24 hour period. Tylenol is an effective drug when taken at the prescribed dosages but can cause bodily injury including liver damage if taken too often or at too high of dose.  - You can take 600mg of Ibuprofen (Motrin, Advil) by mouth with food every 6-8 hours (no more than 3200mg in 24hrs).    - You can take one or the other every 3 hours while awake (such that each is taken every 6 hours). For example, if you take Tylenol when you get home then you would take ibuprofen 3 hours later followed by another Tylenol dose 3 hours after that. Write down the times you are taking both medications to ensure appropriate time in between doses.    Please return to the Emergency Department if you have uncontrolled/worsening pain, difficulty breathing, numbness, inability to keep food/fluids down, or any other new or concerning symptoms. Otherwise please follow up with the [facility] for recheck.    Included is some information that you might find informative and useful.    Thank you for choosing [facility]. It was a pleasure taking care of you today!  - Dr. Vaelrie Vang

## 2023-07-25 NOTE — ED PROVIDER NOTES
"EMERGENCY DEPARTMENT ENCOUNTER      NAME: Carmen Tucker  YOB: 1965  MRN: [id number]    FINAL IMPRESSION  1. Acute pain of left knee    2. Strain of left knee, initial encounter    3. Effusion of left knee        MEDICAL DECISION MAKING   Pertinent Labs & Imaging studies reviewed. (See chart for details)    Carmen Tucker is a 58 year old female who presents for evaluation of left knee pain patient reports onset of.  Symptoms about 1 week ago.  She reports that she awoke with pain in the posterior aspect of her knee but cannot identify any obvious injury.  She does note that she \"sleeps like an eggbeaters,\" and frequently moves around overnight.  Over the last few days, she has had increasing difficulty moving around, specifically going up stairs.  She has tried Tylenol, Advil, Motrin, and ice but not had much improvement in symptoms.  She does not have any associated swelling, redness, fevers, rashes, or other new injuries or complaints.  She did recently start a new job and is on her feet almost all day. Remainder of history and exam, as below.     I considered a broad differential including but not limited to muscular strain/sprain, Baker's cyst, effusion, ligamentous injury, meniscal injury, fracture, dislocation, degenerative joint disease, arthritis.  Low suspicion for septic arthritis or other inflammatory arthropathy given history and exam.  Patient has not had any obvious falls or injury to suggest fracture or dislocation.  Distal CMS is intact so I also have low suspicion for neurovascular process.  There is no tenderness to palpation of calf to suggest DVT.  An x-ray of the knee was ordered while patient was awaiting evaluation in triage due to boarding crisis.  I independently interpreted/reviewed x-ray.  This showed no evidence of fracture, dislocation, or other bony injury but did reveal a small effusion.  I reviewed these results with the patient during initial encounter.  " Explained that at this point, I suspect her symptoms are most likely related to a ligamentous or meniscal injury, muscular strain/sprain, or potentially ruptured Baker's cyst.  She felt reassured, she was primarily concerned about a bony process.  Fortunately, she has been able to ambulate here with a relatively steady gait.  Discussed options for ongoing work-up and management.  We have agreed on plan to place patient in a brace and have her follow-up closely with [facility] for recheck.  I did offer a pair of crutches with patient declined these as well as any analgesics.    Patient was fitted with a knee brace and after this, felt more comfortable and eager to go home.  She felt comfortable with plan to call [facility] to arrange an appointment for follow-up and in the meantime, we will focus on conservative management of symptoms.  Strict return precautions and follow up recommendations were discussed and all questions were answered. Patient endorsed understanding and was in agreement with plan.    I independently reviewed XR (as noted above), formal radiologist read pending.      Medical Decision Making    History:  Supplemental history from: Documented in chart, if applicable  External Record(s) reviewed: Documented in chart, if applicable.     Work Up:  Chart documentation includes differential considered and any EKGs or imaging independently interpreted by provider, where specified.  In additional to work up documented, I considered the following work up: Documented in chart, if applicable.    External consultation:  Discussion of management with another provider: Documented in chart, if applicable    Complicating factors:  Care impacted by chronic illness: Mental Health and Smoking / Nicotine Use  Care affected by social determinants of health: Access to Medical Care    Disposition considerations: Discharge. No recommendations on prescription strength medication(s). See documentation for any additional  "details.      ED COURSE  10:01 PM I met with the patient, obtained history, performed an initial exam, and discussed options and plan for diagnostics and treatment here in the ED.        MEDICATIONS GIVEN IN THE ED  Medications - No data to display    NEW PRESCRIPTIONS STARTED AT TODAY'S VISIT  Discharge Medication List as of 7/24/2023  9:58 PM             =================================================================    Chief Complaint   Patient presents with    Knee Pain         HPI:    Patient information was obtained from: Patient    Use of : N/A    Carmen Tucker is a 58 year old female who presents with knee pain.    Patient reports waking up with pain behind her left knee about a week ago. Over the past few days her pain has been worsening and she is unable to go up stairs without pain. She does not recall doing anything. She has been taking advil, tylenol, motrin and ice without any significant relief. Patient notes moving in her sleep but does not recall doing anything to trigger her symptoms.    She denied any leg swelling, redness, injury or calf swelling.       RELEVANT HISTORY, MEDICATIONS, & ALLERGIES   Past medical history, surgical history, family history, medications, and allergies reviewed and pertinent noted in HPI.    REVIEW OF SYSTEMS:  A complete review of systems was performed with pertinent positives and negatives noted in the HPI. All other systems negative.     PHYSICAL EXAM:    Vitals: BP (!) 181/104   Pulse 78   Temp 98.1  F (36.7  C) (Temporal)   Resp 18   Ht 1.727 m (5' 8\")   Wt 105.3 kg (232 lb 3.2 oz)   SpO2 96%   BMI 35.31 kg/m     General: Alert and interactive, comfortable appearing.  HENT: Atraumatic. Full AROM of neck. Conjunctiva clear.   Cardiovascular: Regular rate and rhythm.   Chest/Pulmonary: Normal work of breathing. Speaking in complete sentences. Lungs CTAB. No chest wall tenderness or deformities.  Abdomen: Soft, nondistended. Nontender without " guarding or rebound.  Extremities: Normal AROM of all major joints. No tenderness to palpation, no deformity or swelling. No obvious ligamentous instability. No pain with varus or valgus stress. No tenderness to calf and palpable DP and TP muscles.  Skin: Warm and dry. Normal skin color.  No redness or warmth.  Neuro: Speech clear. CNs grossly intact. Moves all extremities spontaneously.   Psych: Normal affect/mood, cooperative, memory appropriate.    RADIOLOGY  XR Knee Left 3 Views   Final Result   IMPRESSION: Small knee joint effusion. No fractures are evident. Normal patellar alignment.          I, Marleny Ruvalcaba , am serving as a scribe to document services personally performed by Dr. Valerie Vang based on my observation and the provider's statements to me. I, Valerie Vang MD attest that Marleny Ruvalcaba  is acting in a scribe capacity, has observed my performance of the services and has documented them in accordance with my direction.    Valerie Vang M.D.  Emergency Medicine  [facility] EMERGENCY ROOM  [address] [id number] [phone]  Dept: [phone]       Valerie Vang MD  07/25/23 0253

## 2023-07-25 NOTE — ED TRIAGE NOTES
L knee pain x 3 days, no injuries. Motrin at 1400 with no relief. No deformities or swelling apparent.      Triage Assessment       Row Name 07/24/23 2053       Triage Assessment (Adult)    Airway WDL WDL       Respiratory WDL    Respiratory WDL WDL       Skin Circulation/Temperature WDL    Skin Circulation/Temperature WDL WDL       Cardiac WDL    Cardiac WDL WDL       Peripheral/Neurovascular WDL    Peripheral Neurovascular WDL WDL       Cognitive/Neuro/Behavioral WDL    Cognitive/Neuro/Behavioral WDL WDL

## 2023-08-12 ENCOUNTER — HEALTH MAINTENANCE LETTER (OUTPATIENT)
Age: 58
End: 2023-08-12

## 2024-09-29 ENCOUNTER — HEALTH MAINTENANCE LETTER (OUTPATIENT)
Age: 59
End: 2024-09-29

## 2025-05-23 NOTE — TELEPHONE ENCOUNTER
I called the patient as well and left a VM stating that she is needing to do labs for her levothyroxine. Please advise on the pended orders.   
Recommend video visit for all effexor as well  
yes

## 2025-08-31 ENCOUNTER — HEALTH MAINTENANCE LETTER (OUTPATIENT)
Age: 60
End: 2025-08-31